# Patient Record
Sex: FEMALE | Race: WHITE | Employment: FULL TIME | URBAN - METROPOLITAN AREA
[De-identification: names, ages, dates, MRNs, and addresses within clinical notes are randomized per-mention and may not be internally consistent; named-entity substitution may affect disease eponyms.]

---

## 2017-01-04 ENCOUNTER — TELEPHONE (OUTPATIENT)
Dept: INTERNAL MEDICINE CLINIC | Facility: CLINIC | Age: 27
End: 2017-01-04

## 2017-01-04 NOTE — TELEPHONE ENCOUNTER
Patient is requesting 2 step TB test for school, as well as TDAP. Patient had last TDAP 8 years ago, but clinic no longer has documentation of vaccine. Patient wants to know if it is ok to get it before 10 years?  If it's ok can you please put the order a

## 2017-01-04 NOTE — TELEPHONE ENCOUNTER
Patient states TDAP was given when they had paper charts and they don't have record of it anymore maybe lost in conversion to EMR. However she does need it for school can she have a new one done here?

## 2017-01-05 ENCOUNTER — NURSE ONLY (OUTPATIENT)
Dept: INTERNAL MEDICINE CLINIC | Facility: CLINIC | Age: 27
End: 2017-01-05

## 2017-01-05 PROCEDURE — 90471 IMMUNIZATION ADMIN: CPT | Performed by: INTERNAL MEDICINE

## 2017-01-05 PROCEDURE — 90715 TDAP VACCINE 7 YRS/> IM: CPT | Performed by: INTERNAL MEDICINE

## 2017-01-05 NOTE — TELEPHONE ENCOUNTER
Can you please put in order for TDAP? Patient asking if TB Quantiferon would be better instead of the two step PPD?

## 2017-01-26 ENCOUNTER — TELEPHONE (OUTPATIENT)
Dept: INTERNAL MEDICINE CLINIC | Facility: CLINIC | Age: 27
End: 2017-01-26

## 2017-01-26 NOTE — TELEPHONE ENCOUNTER
Patient c/o stuffy nose, itchy eyes, and sinus pressure. Patient believes she is having some sort of allergy related illness, and patient is inquiring if she can have some allergy labs drawn? Patient also requesting order for quantiferon. Please advice.

## 2017-02-16 ENCOUNTER — TELEPHONE (OUTPATIENT)
Dept: INTERNAL MEDICINE CLINIC | Facility: CLINIC | Age: 27
End: 2017-02-16

## 2017-02-16 NOTE — TELEPHONE ENCOUNTER
Patient calling having severe migraine 6/10. Nausea with vomiting. No fever, no LOC. Patient wants to know what's best to take for head pain? Please advise.

## 2017-02-20 ENCOUNTER — TELEPHONE (OUTPATIENT)
Dept: INTERNAL MEDICINE CLINIC | Facility: CLINIC | Age: 27
End: 2017-02-20

## 2017-02-20 DIAGNOSIS — Z02.0 SCHOOL PHYSICAL EXAM: Primary | ICD-10-CM

## 2017-02-22 ENCOUNTER — APPOINTMENT (OUTPATIENT)
Dept: LAB | Facility: HOSPITAL | Age: 27
End: 2017-02-22
Attending: INTERNAL MEDICINE
Payer: COMMERCIAL

## 2017-02-22 DIAGNOSIS — Z11.1 SCREENING-PULMONARY TB: ICD-10-CM

## 2017-02-22 DIAGNOSIS — Z02.0 SCHOOL PHYSICAL EXAM: ICD-10-CM

## 2017-02-22 PROCEDURE — 86480 TB TEST CELL IMMUN MEASURE: CPT

## 2017-02-22 PROCEDURE — 36415 COLL VENOUS BLD VENIPUNCTURE: CPT

## 2017-02-22 PROCEDURE — 86706 HEP B SURFACE ANTIBODY: CPT

## 2017-02-23 LAB
HBV SURFACE AB SER-ACNC: 69.13 MIU/ML (ref ?–10)
HBV SURFACE AG SERPL QL IA: REACTIVE

## 2017-02-24 LAB
M TB IFN-G CD4+ BCKGRND COR BLD-ACNC: 0 IU/ML
M TB IFN-G CD4+ T-CELLS BLD-ACNC: 0.08 IU/ML
M TB TUBERC IFN-G BLD QL: NEGATIVE
M TB TUBERC IGNF/MITOGEN IGNF CONTROL: 9.82 IU/ML

## 2017-03-06 ENCOUNTER — OFFICE VISIT (OUTPATIENT)
Dept: INTERNAL MEDICINE CLINIC | Facility: CLINIC | Age: 27
End: 2017-03-06

## 2017-03-06 VITALS
TEMPERATURE: 98 F | DIASTOLIC BLOOD PRESSURE: 80 MMHG | HEART RATE: 90 BPM | WEIGHT: 235 LBS | SYSTOLIC BLOOD PRESSURE: 128 MMHG | BODY MASS INDEX: 35.61 KG/M2 | HEIGHT: 68 IN

## 2017-03-06 DIAGNOSIS — E66.01 MORBID OBESITY, UNSPECIFIED OBESITY TYPE (HCC): ICD-10-CM

## 2017-03-06 DIAGNOSIS — R53.83 OTHER FATIGUE: ICD-10-CM

## 2017-03-06 DIAGNOSIS — D50.9 IRON DEFICIENCY ANEMIA, UNSPECIFIED IRON DEFICIENCY ANEMIA TYPE: Primary | ICD-10-CM

## 2017-03-06 LAB
BASOPHILS # BLD: 0 K/UL (ref 0–0.2)
BASOPHILS NFR BLD: 0 %
CHOLEST SERPL-MCNC: 173 MG/DL (ref 110–200)
EOSINOPHIL # BLD: 0.1 K/UL (ref 0–0.7)
EOSINOPHIL NFR BLD: 1 %
ERYTHROCYTE [DISTWIDTH] IN BLOOD BY AUTOMATED COUNT: 15.8 % (ref 11–15)
FERRITIN SERPL IA-MCNC: 9 NG/ML (ref 11–307)
HCT VFR BLD AUTO: 40.2 % (ref 35–48)
HDLC SERPL-MCNC: 38 MG/DL
HGB BLD-MCNC: 12.9 G/DL (ref 12–16)
IRON SATN MFR SERPL: 7 % (ref 15–50)
IRON SERPL-MCNC: 29 MCG/DL (ref 28–170)
LDLC SERPL CALC-MCNC: 117 MG/DL (ref 0–99)
LYMPHOCYTES # BLD: 1.5 K/UL (ref 1–4)
LYMPHOCYTES NFR BLD: 25 %
MCH RBC QN AUTO: 25.5 PG (ref 27–32)
MCHC RBC AUTO-ENTMCNC: 32.1 G/DL (ref 32–37)
MCV RBC AUTO: 79.6 FL (ref 80–100)
MONOCYTES # BLD: 0.3 K/UL (ref 0–1)
MONOCYTES NFR BLD: 6 %
NEUTROPHILS # BLD AUTO: 3.8 K/UL (ref 1.8–7.7)
NEUTROPHILS NFR BLD: 67 %
NONHDLC SERPL-MCNC: 135 MG/DL
PLATELET # BLD AUTO: 348 K/UL (ref 140–400)
PMV BLD AUTO: 8.6 FL (ref 7.4–10.3)
RBC # BLD AUTO: 5.06 M/UL (ref 3.7–5.4)
TIBC SERPL-MCNC: 413 MCG/DL (ref 228–428)
TRANSFERRIN SERPL-MCNC: 313 MG/DL (ref 192–382)
TRANSFERRIN SERPL-MCNC: 313 MG/DL (ref 192–382)
TRIGL SERPL-MCNC: 92 MG/DL (ref 1–149)
WBC # BLD AUTO: 5.7 K/UL (ref 4–11)

## 2017-03-06 PROCEDURE — 36415 COLL VENOUS BLD VENIPUNCTURE: CPT | Performed by: INTERNAL MEDICINE

## 2017-03-06 PROCEDURE — 99213 OFFICE O/P EST LOW 20 MIN: CPT | Performed by: INTERNAL MEDICINE

## 2017-03-06 PROCEDURE — 99212 OFFICE O/P EST SF 10 MIN: CPT | Performed by: INTERNAL MEDICINE

## 2017-03-06 RX ORDER — PHENTERMINE HYDROCHLORIDE 37.5 MG/1
37.5 CAPSULE ORAL EVERY MORNING
Qty: 30 CAPSULE | Refills: 2 | Status: SHIPPED | OUTPATIENT
Start: 2017-03-06 | End: 2018-05-22

## 2017-03-06 NOTE — PROGRESS NOTES
HPI:    Patient ID: Tani Weaver is a 27year old female. HPI  Pt here for follow up on her anemia, she has been taking the iron tabs and eating more iron rich food. Pt over all feeling better bit still fatigued, she has not been told to snore.  Pt wa intact distal pulses. Pulmonary/Chest: Effort normal and breath sounds normal.   Abdominal: Soft. Bowel sounds are normal.   Musculoskeletal: Normal range of motion. Neurological: She is alert and oriented to person, place, and time.  She has normal re

## 2017-03-06 NOTE — PATIENT INSTRUCTIONS
ASSESSMENT/PLAN:   Iron deficiency anemia, unspecified iron deficiency anemia type  (primary encounter diagnosis)- will retest, will let you know of results   Other fatigue- ?  Need to loose weight , diet and exercise, labs so fare ok pt says she has not be

## 2017-03-22 ENCOUNTER — NURSE ONLY (OUTPATIENT)
Dept: INTERNAL MEDICINE CLINIC | Facility: CLINIC | Age: 27
End: 2017-03-22

## 2017-03-22 ENCOUNTER — TELEPHONE (OUTPATIENT)
Dept: INTERNAL MEDICINE CLINIC | Facility: CLINIC | Age: 27
End: 2017-03-22

## 2017-03-22 DIAGNOSIS — Z02.0 SCHOOL PHYSICAL EXAM: ICD-10-CM

## 2017-03-22 DIAGNOSIS — Z02.0 SCHOOL PHYSICAL EXAM: Primary | ICD-10-CM

## 2017-03-22 PROCEDURE — 36415 COLL VENOUS BLD VENIPUNCTURE: CPT | Performed by: INTERNAL MEDICINE

## 2017-03-23 LAB — HBV SURFACE AG SERPL QL IA: NONREACTIVE

## 2017-04-30 ENCOUNTER — OFFICE VISIT (OUTPATIENT)
Dept: FAMILY MEDICINE CLINIC | Facility: CLINIC | Age: 27
End: 2017-04-30

## 2017-04-30 VITALS
SYSTOLIC BLOOD PRESSURE: 128 MMHG | OXYGEN SATURATION: 98 % | HEART RATE: 90 BPM | WEIGHT: 286 LBS | DIASTOLIC BLOOD PRESSURE: 80 MMHG | RESPIRATION RATE: 18 BRPM | BODY MASS INDEX: 44 KG/M2 | TEMPERATURE: 98 F

## 2017-04-30 DIAGNOSIS — J02.9 PHARYNGITIS, UNSPECIFIED ETIOLOGY: Primary | ICD-10-CM

## 2017-04-30 PROCEDURE — 87880 STREP A ASSAY W/OPTIC: CPT | Performed by: NURSE PRACTITIONER

## 2017-04-30 PROCEDURE — 99203 OFFICE O/P NEW LOW 30 MIN: CPT | Performed by: NURSE PRACTITIONER

## 2017-04-30 NOTE — PROGRESS NOTES
Ophelia Chandler CHIEF COMPLAINT:   Patient presents with:  Sore Throat: since thursday, unsure of contact. HPI:   Eriberto Briceño is a 32year old female presents to clinic with complaint of sore throat. Patient has had for 4  days.  Patient reports following a THROAT: oral mucosa pink, moist. Posterior pharynx erythematous and injected. No exudates. Tonsils 2/4. Breath is not malodorous. No trismus, hoarseness, muffled voice, stridor, or uvular deviation.     NECK: supple, non-tender  LUNGS: clear to auscultati Sore throats happen for many reasons, such as colds, allergies, and infections caused by viruses or bacteria. In any case, your throat becomes red and sore.  Your goal for self-care is to reduce your discomfort while giving your throat a chance to heal. · A temperature over 101°F (38.3°C)  · White spots on the throat  · Great difficulty swallowing  · Trouble breathing  · A skin rash  · Recent exposure to someone else with strep bacteria  · Severe hoarseness and swollen glands in the neck or jaw   Date Las

## 2017-06-28 ENCOUNTER — TELEPHONE (OUTPATIENT)
Dept: INTERNAL MEDICINE CLINIC | Facility: CLINIC | Age: 27
End: 2017-06-28

## 2017-06-28 RX ORDER — ALPRAZOLAM 0.5 MG/1
0.5 TABLET ORAL 2 TIMES DAILY PRN
Qty: 10 TABLET | Refills: 0 | Status: SHIPPED | OUTPATIENT
Start: 2017-06-28 | End: 2017-07-08

## 2017-06-28 NOTE — TELEPHONE ENCOUNTER
Patient will be going out of state this weekend and has anxiety when flying. Requesting prescription.

## 2017-08-03 ENCOUNTER — NURSE ONLY (OUTPATIENT)
Dept: INTERNAL MEDICINE CLINIC | Facility: CLINIC | Age: 27
End: 2017-08-03

## 2017-08-03 ENCOUNTER — TELEPHONE (OUTPATIENT)
Dept: INTERNAL MEDICINE CLINIC | Facility: CLINIC | Age: 27
End: 2017-08-03

## 2017-08-03 DIAGNOSIS — Z20.2 EXPOSURE TO STD: Primary | ICD-10-CM

## 2017-08-03 DIAGNOSIS — Z20.2 EXPOSURE TO STD: ICD-10-CM

## 2017-08-03 NOTE — TELEPHONE ENCOUNTER
Patient has a concern. Due to recent needle stick at her employer, was told that she has \"moderate leukocyte esterase,\" in her urine and was told that she might have a bladder infection.   Patient is asymptomatic, but wants to know if she can have a urin

## 2017-08-03 NOTE — TELEPHONE ENCOUNTER
We can check us but that has nothing to do with needle stick  She needs t be checkd only of rhiv, hep b and hep c

## 2017-08-05 LAB
C TRACH DNA SPEC QL NAA+PROBE: NEGATIVE
N GONORRHOEA DNA SPEC QL NAA+PROBE: NEGATIVE

## 2017-09-16 ENCOUNTER — HOSPITAL ENCOUNTER (OUTPATIENT)
Age: 27
Discharge: HOME | End: 2017-09-16
Attending: FAMILY MEDICINE
Payer: COMMERCIAL

## 2017-09-16 VITALS
RESPIRATION RATE: 16 BRPM | DIASTOLIC BLOOD PRESSURE: 78 MMHG | SYSTOLIC BLOOD PRESSURE: 121 MMHG | HEART RATE: 92 BPM | OXYGEN SATURATION: 99 % | TEMPERATURE: 98 F

## 2017-09-16 DIAGNOSIS — N30.01 ACUTE CYSTITIS WITH HEMATURIA: Primary | ICD-10-CM

## 2017-09-16 LAB
B-HCG UR QL: NEGATIVE
URINE COLOR: YELLOW
URINE GLUCOSE: NEGATIVE MG/DL
URINE KETONES: NEGATIVE MG/DL
URINE NITRITE: NEGATIVE
URINE PH: 5
URINE SPECIFIC GRAVITY: >=1.03
URINE UROBILINOGEN: 0.2 MG/DL

## 2017-09-16 PROCEDURE — 87086 URINE CULTURE/COLONY COUNT: CPT | Performed by: FAMILY MEDICINE

## 2017-09-16 PROCEDURE — 81025 URINE PREGNANCY TEST: CPT

## 2017-09-16 PROCEDURE — 99214 OFFICE O/P EST MOD 30 MIN: CPT

## 2017-09-16 PROCEDURE — 81002 URINALYSIS NONAUTO W/O SCOPE: CPT

## 2017-09-16 RX ORDER — SULFAMETHOXAZOLE AND TRIMETHOPRIM 800; 160 MG/1; MG/1
1 TABLET ORAL 2 TIMES DAILY
Qty: 14 TABLET | Refills: 0 | Status: SHIPPED | OUTPATIENT
Start: 2017-09-16 | End: 2017-09-23

## 2017-09-16 NOTE — ED INITIAL ASSESSMENT (HPI)
Patient comes in with urinary symptoms for the past two days, urgency, frequency. Positive blood in the urine, positive right-sided back pain.

## 2017-09-16 NOTE — ED PROVIDER NOTES
Patient Seen in: [address]    History   Patient presents with:  Urinary Symptoms (urologic)    Stated Complaint: blood in urine    HPI    32year old Female patient presents with dysuria, urgency and frequency for several Pulse 92   Temp 98.3 °F (36.8 °C) (Oral)   Resp 16   LMP 08/29/2017 (Exact Date)   SpO2 99%     GENERAL: hydrated, comfortable, NAD, overweight habitus  HEENT:  PERRLA, EOMI, MMM  NECK: supple, no adenopathy  LUNGS:  b/l cta with good air entry  CARDIO: R

## 2017-09-19 ENCOUNTER — NURSE ONLY (OUTPATIENT)
Dept: INTERNAL MEDICINE CLINIC | Facility: CLINIC | Age: 27
End: 2017-09-19

## 2017-09-19 DIAGNOSIS — R31.9 HEMATURIA, UNSPECIFIED TYPE: Primary | ICD-10-CM

## 2017-09-19 DIAGNOSIS — N20.0 KIDNEY STONE: ICD-10-CM

## 2017-09-19 LAB
MULTISTIX LOT#: NORMAL NUMERIC
PH, URINE: 6.5 (ref 4.5–8)
SPECIFIC GRAVITY: 1.02 (ref 1–1.03)
URINE-COLOR: YELLOW
UROBILINOGEN,SEMI-QN: 0.2 MG/DL (ref 0–1.9)

## 2017-09-19 PROCEDURE — 81003 URINALYSIS AUTO W/O SCOPE: CPT | Performed by: INTERNAL MEDICINE

## 2017-09-30 ENCOUNTER — HOSPITAL ENCOUNTER (OUTPATIENT)
Dept: GENERAL RADIOLOGY | Facility: HOSPITAL | Age: 27
Discharge: HOME | End: 2017-09-30
Attending: INTERNAL MEDICINE
Payer: COMMERCIAL

## 2017-09-30 DIAGNOSIS — N20.0 KIDNEY STONE: ICD-10-CM

## 2017-09-30 DIAGNOSIS — R31.9 HEMATURIA, UNSPECIFIED TYPE: ICD-10-CM

## 2017-09-30 PROCEDURE — 74000 XR ABDOMEN (1 VIEW) (CPT=74000): CPT | Performed by: INTERNAL MEDICINE

## 2018-01-31 ENCOUNTER — OFFICE VISIT (OUTPATIENT)
Dept: OBGYN CLINIC | Facility: CLINIC | Age: 28
End: 2018-01-31

## 2018-01-31 VITALS
BODY MASS INDEX: 42 KG/M2 | SYSTOLIC BLOOD PRESSURE: 122 MMHG | HEART RATE: 84 BPM | DIASTOLIC BLOOD PRESSURE: 81 MMHG | WEIGHT: 279 LBS

## 2018-01-31 DIAGNOSIS — Z11.3 VENEREAL DISEASE SCREENING: ICD-10-CM

## 2018-01-31 DIAGNOSIS — Z12.4 SCREENING FOR MALIGNANT NEOPLASM OF CERVIX: ICD-10-CM

## 2018-01-31 DIAGNOSIS — Z01.419 ENCOUNTER FOR GYNECOLOGICAL EXAMINATION WITHOUT ABNORMAL FINDING: Primary | ICD-10-CM

## 2018-01-31 PROCEDURE — 99385 PREV VISIT NEW AGE 18-39: CPT | Performed by: OBSTETRICS & GYNECOLOGY

## 2018-01-31 NOTE — PROGRESS NOTES
HPI:    Patient ID: Carina Lake is a 32year old female. HPI   with menses q 1-2 months / 3d / light on [place] placed in 2016 for [facility]. She is in an 18 month on / off relationship. She is finishing bachelors in Nursing at [employer].  She heart sounds. No murmur heard. Pulmonary/Chest: Effort normal and breath sounds normal. She has no wheezes. She has no rales. Bilateral breasts without skin / nipple changes, mass, tenderness or axillary adenopathy. Abdominal: Soft.  She exhibits

## 2018-02-02 LAB
C TRACH DNA SPEC QL NAA+PROBE: NEGATIVE
N GONORRHOEA DNA SPEC QL NAA+PROBE: NEGATIVE
T VAGINALIS RRNA SPEC QL NAA+PROBE: NEGATIVE

## 2018-05-22 ENCOUNTER — OFFICE VISIT (OUTPATIENT)
Dept: INTERNAL MEDICINE CLINIC | Facility: CLINIC | Age: 28
End: 2018-05-22

## 2018-05-22 VITALS
SYSTOLIC BLOOD PRESSURE: 130 MMHG | DIASTOLIC BLOOD PRESSURE: 89 MMHG | BODY MASS INDEX: 41 KG/M2 | HEART RATE: 97 BPM | TEMPERATURE: 98 F | WEIGHT: 271 LBS

## 2018-05-22 DIAGNOSIS — Z86.2 HISTORY OF IRON DEFICIENCY ANEMIA: ICD-10-CM

## 2018-05-22 DIAGNOSIS — R55 PRE-SYNCOPE: ICD-10-CM

## 2018-05-22 DIAGNOSIS — R03.0 BLOOD PRESSURE ELEVATED WITHOUT HISTORY OF HTN: ICD-10-CM

## 2018-05-22 DIAGNOSIS — R00.2 HEART PALPITATIONS: Primary | ICD-10-CM

## 2018-05-22 PROCEDURE — 93000 ELECTROCARDIOGRAM COMPLETE: CPT | Performed by: INTERNAL MEDICINE

## 2018-05-22 PROCEDURE — 99214 OFFICE O/P EST MOD 30 MIN: CPT | Performed by: INTERNAL MEDICINE

## 2018-05-22 NOTE — PROGRESS NOTES
HPI:    Patient ID: Xiomy Yi is a 25year old female.     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 She appears well-developed and well-nourished.    IZABELA

## 2018-05-22 NOTE — PATIENT INSTRUCTIONS
ASSESSMENT/PLAN:   Heart palpitations  (primary encounter diagnosis) eKG looks okay we will order Holter monitor and echo symptoms have been again let us know go to ER  Pre-syncope as above  Blood pressure elevated without history of htn slightly on the hi

## 2018-06-24 ENCOUNTER — HOSPITAL ENCOUNTER (EMERGENCY)
Facility: HOSPITAL | Age: 28
Discharge: HOME | End: 2018-06-25
Attending: EMERGENCY MEDICINE
Payer: COMMERCIAL

## 2018-06-24 ENCOUNTER — APPOINTMENT (OUTPATIENT)
Dept: GENERAL RADIOLOGY | Facility: HOSPITAL | Age: 28
End: 2018-06-24
Attending: EMERGENCY MEDICINE
Payer: COMMERCIAL

## 2018-06-24 DIAGNOSIS — S92.301A CLOSED NONDISPLACED FRACTURE OF METATARSAL BONE OF RIGHT FOOT, UNSPECIFIED METATARSAL, INITIAL ENCOUNTER: Primary | ICD-10-CM

## 2018-06-24 PROCEDURE — 99284 EMERGENCY DEPT VISIT MOD MDM: CPT

## 2018-06-24 PROCEDURE — 28470 CLTX METATARSAL FX WO MNP EA: CPT

## 2018-06-24 PROCEDURE — 73630 X-RAY EXAM OF FOOT: CPT | Performed by: EMERGENCY MEDICINE

## 2018-06-24 PROCEDURE — 73610 X-RAY EXAM OF ANKLE: CPT | Performed by: EMERGENCY MEDICINE

## 2018-06-24 RX ORDER — IBUPROFEN 600 MG/1
600 TABLET ORAL ONCE
Status: COMPLETED | OUTPATIENT
Start: 2018-06-24 | End: 2018-06-24

## 2018-06-25 ENCOUNTER — TELEPHONE (OUTPATIENT)
Dept: INTERNAL MEDICINE CLINIC | Facility: CLINIC | Age: 28
End: 2018-06-25

## 2018-06-25 VITALS
HEART RATE: 81 BPM | DIASTOLIC BLOOD PRESSURE: 98 MMHG | SYSTOLIC BLOOD PRESSURE: 143 MMHG | RESPIRATION RATE: 17 BRPM | TEMPERATURE: 97 F | OXYGEN SATURATION: 98 % | BODY MASS INDEX: 37.89 KG/M2 | HEIGHT: 68 IN | WEIGHT: 250 LBS

## 2018-06-25 DIAGNOSIS — S92.901A CLOSED FRACTURE OF RIGHT FOOT, INITIAL ENCOUNTER: Primary | ICD-10-CM

## 2018-06-25 NOTE — ED PROVIDER NOTES
Patient Seen in: [facility] AND [facility] Emergency Department    History   Patient presents with:  Lower Extremity Injury (musculoskeletal)      HPI    Patient presents to the ED complaining of right ankle pain after tripping and falling while going down a yuly tracheal deviation present. Cardiovascular: Intact distal pulses. Pulmonary/Chest: Effort normal. No stridor. No respiratory distress. Abdominal: Soft. Musculoskeletal: She exhibits tenderness. She exhibits no edema or deformity.    Tenderness to t Factors: The patient already has has Osteochondral lesion; Chronic pain of right knee;  History of iron deficiency anemia; Blood pressure elevated without history of HTN; Heart palpitations; and Pre-syncope on her problem list. to contribute to the complexi

## 2018-06-26 ENCOUNTER — TELEPHONE (OUTPATIENT)
Dept: INTERNAL MEDICINE CLINIC | Facility: CLINIC | Age: 28
End: 2018-06-26

## 2018-06-26 DIAGNOSIS — S92.901D CLOSED FRACTURE OF RIGHT FOOT WITH ROUTINE HEALING, SUBSEQUENT ENCOUNTER: Primary | ICD-10-CM

## 2018-06-26 NOTE — TELEPHONE ENCOUNTER
Pt saw Dr Janet Lizama, was told to call us and request a referral for a Walking boot for her  fracture of right foot,

## 2018-06-27 ENCOUNTER — TELEPHONE (OUTPATIENT)
Dept: OBGYN CLINIC | Facility: CLINIC | Age: 28
End: 2018-06-27

## 2018-06-27 NOTE — TELEPHONE ENCOUNTER
Pt has an IUD and wants it removed by THERESA.  (Notes state it was inserted April 2016. It was inserted by another MD, not our group.)  Pt states that she has btb. She will bleed for one day and then stop and start up again.   Pt states previously she bled f

## 2018-07-02 NOTE — TELEPHONE ENCOUNTER
Order for leg splint faxed to [facility] in [place] Fax # [phone]

## 2018-07-03 ENCOUNTER — TELEPHONE (OUTPATIENT)
Dept: INTERNAL MEDICINE CLINIC | Facility: CLINIC | Age: 28
End: 2018-07-03

## 2018-07-03 DIAGNOSIS — S92.902A CLOSED FRACTURE OF LEFT FOOT, INITIAL ENCOUNTER: Primary | ICD-10-CM

## 2018-07-11 ENCOUNTER — OFFICE VISIT (OUTPATIENT)
Dept: OBGYN CLINIC | Facility: CLINIC | Age: 28
End: 2018-07-11

## 2018-07-11 VITALS — SYSTOLIC BLOOD PRESSURE: 120 MMHG | DIASTOLIC BLOOD PRESSURE: 80 MMHG | HEART RATE: 92 BPM

## 2018-07-11 DIAGNOSIS — Z30.432 ENCOUNTER FOR REMOVAL OF INTRAUTERINE CONTRACEPTIVE DEVICE: Primary | ICD-10-CM

## 2018-07-11 PROCEDURE — 58301 REMOVE INTRAUTERINE DEVICE: CPT | Performed by: OBSTETRICS & GYNECOLOGY

## 2018-07-21 NOTE — PROGRESS NOTES
IUD Removal     Pregnancy Results: n/a  Birth control method(s) used: Kamila / abstinence  Consent signed. Procedure discussed with the patient in detail including indication, risks, benefits, alternatives and complications.     Pelvic Exam Findings:  EG, v

## 2018-10-22 ENCOUNTER — PATIENT MESSAGE (OUTPATIENT)
Dept: OBGYN CLINIC | Facility: CLINIC | Age: 28
End: 2018-10-22

## 2018-10-23 NOTE — TELEPHONE ENCOUNTER
C/O INTERMITTENT LLQ PAIN. PERIOD IS 4 DAYS LATE NOW. UPT WAS NEGATIVE. DOES NOT FEEL LIKE SHE IS PREGNANT EITHER. STATES MOM WITH H/O MULTIPLE OVARIAN CYSTS AND WONDERS IF SHE HAS ONE.   BOOKED APPT FOR TOMORROW WITH THERESA.

## 2018-10-24 ENCOUNTER — OFFICE VISIT (OUTPATIENT)
Dept: OBGYN CLINIC | Facility: CLINIC | Age: 28
End: 2018-10-24

## 2018-10-24 VITALS
BODY MASS INDEX: 38 KG/M2 | DIASTOLIC BLOOD PRESSURE: 85 MMHG | WEIGHT: 252 LBS | SYSTOLIC BLOOD PRESSURE: 128 MMHG | HEART RATE: 87 BPM

## 2018-10-24 DIAGNOSIS — R10.2 ACUTE PELVIC PAIN, FEMALE: Primary | ICD-10-CM

## 2018-10-24 PROCEDURE — 99213 OFFICE O/P EST LOW 20 MIN: CPT | Performed by: OBSTETRICS & GYNECOLOGY

## 2018-11-05 NOTE — PROGRESS NOTES
HPI:    Patient ID: Buddy Quiñones is a 25year old female. HPI  with monthly menses and condoms for [facility]. Kamila removed 18 for persistent metrorrhagia. SInce then menses q 30d / 7d / 2 heaviwr days.  She is here c/o 3 days of sharp LLQ pain Referrals:  None       DB#[id number]

## 2018-12-14 ENCOUNTER — HOSPITAL ENCOUNTER (OUTPATIENT)
Age: 28
Discharge: HOME | End: 2018-12-14
Attending: FAMILY MEDICINE
Payer: COMMERCIAL

## 2018-12-14 VITALS
OXYGEN SATURATION: 98 % | HEART RATE: 84 BPM | DIASTOLIC BLOOD PRESSURE: 82 MMHG | BODY MASS INDEX: 37.89 KG/M2 | RESPIRATION RATE: 20 BRPM | SYSTOLIC BLOOD PRESSURE: 123 MMHG | TEMPERATURE: 98 F | HEIGHT: 68 IN | WEIGHT: 250 LBS

## 2018-12-14 DIAGNOSIS — J01.00 ACUTE MAXILLARY SINUSITIS, RECURRENCE NOT SPECIFIED: Primary | ICD-10-CM

## 2018-12-14 PROCEDURE — 99213 OFFICE O/P EST LOW 20 MIN: CPT

## 2018-12-14 PROCEDURE — 99214 OFFICE O/P EST MOD 30 MIN: CPT

## 2018-12-14 RX ORDER — METHYLPREDNISOLONE 4 MG/1
TABLET ORAL
Qty: 1 PACKAGE | Refills: 0 | Status: SHIPPED | OUTPATIENT
Start: 2018-12-14 | End: 2018-12-19

## 2018-12-14 RX ORDER — AMOXICILLIN 875 MG/1
875 TABLET, COATED ORAL 2 TIMES DAILY
Qty: 20 TABLET | Refills: 0 | Status: SHIPPED | OUTPATIENT
Start: 2018-12-14 | End: 2018-12-24

## 2018-12-14 RX ORDER — FLUTICASONE PROPIONATE 50 MCG
1 SPRAY, SUSPENSION (ML) NASAL 2 TIMES DAILY
Qty: 1 BOTTLE | Refills: 0 | Status: SHIPPED | OUTPATIENT
Start: 2018-12-14 | End: 2018-12-28

## 2018-12-14 NOTE — ED INITIAL ASSESSMENT (HPI)
Pt presents to clinic by self c/o cough and sore throat for 1 month. She reports cough has worsened within the last few days. She states right-sided ear pain. Denied fever, body aches/chills, n/v/d. She reports taking dayquil, nyquil, ibuprofen, theraflu.

## 2018-12-14 NOTE — ED NOTES
Pt discharged home in stable condition. Reviewed medications, pain, letter and avs. Follow up as indicated. Pt verbalized understanding and agreed.

## 2018-12-15 NOTE — ED PROVIDER NOTES
Patient presents with:  Cough/URI      HPI:     Natividad Luevano is a 25year old female who presents with for chief complaint of nasal congestion, sinus congestion, brown to yellow nasal discharge, mild sore throat, mild cough  X 1 month  The patient denie rub or gallop, regular rate and rhythm  Skin: Skin color, texture, turgor normal. No rashes or lesions      Assessment/Plan:       Diagnosis:    ICD-10-CM    1.  Acute maxillary sinusitis, recurrence not specified J01.00        Steam inhalation, humidified

## 2020-02-13 ENCOUNTER — APPOINTMENT (OUTPATIENT)
Dept: GENERAL RADIOLOGY | Age: 30
End: 2020-02-13
Attending: EMERGENCY MEDICINE
Payer: COMMERCIAL

## 2020-02-13 ENCOUNTER — TELEPHONE (OUTPATIENT)
Dept: INTERNAL MEDICINE CLINIC | Facility: CLINIC | Age: 30
End: 2020-02-13

## 2020-02-13 ENCOUNTER — HOSPITAL ENCOUNTER (OUTPATIENT)
Age: 30
Discharge: HOME | End: 2020-02-13
Attending: EMERGENCY MEDICINE
Payer: COMMERCIAL

## 2020-02-13 VITALS
DIASTOLIC BLOOD PRESSURE: 86 MMHG | SYSTOLIC BLOOD PRESSURE: 131 MMHG | HEART RATE: 95 BPM | RESPIRATION RATE: 20 BRPM | OXYGEN SATURATION: 100 % | TEMPERATURE: 98 F

## 2020-02-13 DIAGNOSIS — S92.514A CLOSED NONDISPLACED FRACTURE OF PROXIMAL PHALANX OF LESSER TOE OF RIGHT FOOT, INITIAL ENCOUNTER: Primary | ICD-10-CM

## 2020-02-13 PROCEDURE — 99213 OFFICE O/P EST LOW 20 MIN: CPT

## 2020-02-13 PROCEDURE — 28510 TREATMENT OF TOE FRACTURE: CPT

## 2020-02-13 PROCEDURE — 73630 X-RAY EXAM OF FOOT: CPT | Performed by: EMERGENCY MEDICINE

## 2020-02-13 RX ORDER — IBUPROFEN 600 MG/1
600 TABLET ORAL ONCE
Status: COMPLETED | OUTPATIENT
Start: 2020-02-13 | End: 2020-02-13

## 2020-02-13 NOTE — TELEPHONE ENCOUNTER
Left message to call back, patient seen in UC today with fracture to toe of right foot. Left msg for follow up appt, recommended in 1 week.     Notes recorded by Zhou Liu MD on 2/13/2020 at 11:44 AM CST  She was seen in urgent care for this fracture

## 2020-02-17 ENCOUNTER — OFFICE VISIT (OUTPATIENT)
Dept: INTERNAL MEDICINE CLINIC | Facility: CLINIC | Age: 30
End: 2020-02-17

## 2020-02-17 VITALS
DIASTOLIC BLOOD PRESSURE: 73 MMHG | HEIGHT: 68 IN | TEMPERATURE: 98 F | HEART RATE: 89 BPM | SYSTOLIC BLOOD PRESSURE: 103 MMHG | RESPIRATION RATE: 18 BRPM | BODY MASS INDEX: 40.92 KG/M2 | WEIGHT: 270 LBS

## 2020-02-17 DIAGNOSIS — Z00.00 ANNUAL PHYSICAL EXAM: Primary | ICD-10-CM

## 2020-02-17 PROBLEM — R03.0 BLOOD PRESSURE ELEVATED WITHOUT HISTORY OF HTN: Status: RESOLVED | Noted: 2018-05-22 | Resolved: 2020-02-17

## 2020-02-17 PROBLEM — R55 PRE-SYNCOPE: Status: RESOLVED | Noted: 2018-05-22 | Resolved: 2020-02-17

## 2020-02-17 PROBLEM — R00.2 HEART PALPITATIONS: Status: RESOLVED | Noted: 2018-05-22 | Resolved: 2020-02-17

## 2020-02-17 LAB
ALBUMIN SERPL-MCNC: 3.6 G/DL (ref 3.4–5)
ALBUMIN/GLOB SERPL: 0.9 {RATIO} (ref 1–2)
ALP LIVER SERPL-CCNC: 94 U/L (ref 37–98)
ALT SERPL-CCNC: 25 U/L (ref 13–56)
ANION GAP SERPL CALC-SCNC: 10 MMOL/L (ref 0–18)
AST SERPL-CCNC: 21 U/L (ref 15–37)
BASOPHILS # BLD AUTO: 0.02 X10(3) UL (ref 0–0.2)
BASOPHILS NFR BLD AUTO: 0.5 %
BILIRUB SERPL-MCNC: 0.3 MG/DL (ref 0.1–2)
BUN BLD-MCNC: 6 MG/DL (ref 7–18)
BUN/CREAT SERPL: 6.7 (ref 10–20)
CALCIUM BLD-MCNC: 9.2 MG/DL (ref 8.5–10.1)
CHLORIDE SERPL-SCNC: 107 MMOL/L (ref 98–112)
CO2 SERPL-SCNC: 23 MMOL/L (ref 21–32)
CREAT BLD-MCNC: 0.9 MG/DL (ref 0.55–1.02)
DEPRECATED HBV CORE AB SER IA-ACNC: 16.5 NG/ML (ref 12–160)
DEPRECATED RDW RBC AUTO: 44.8 FL (ref 35.1–46.3)
EOSINOPHIL # BLD AUTO: 0.06 X10(3) UL (ref 0–0.7)
EOSINOPHIL NFR BLD AUTO: 1.4 %
ERYTHROCYTE [DISTWIDTH] IN BLOOD BY AUTOMATED COUNT: 14.1 % (ref 11–15)
GLOBULIN PLAS-MCNC: 4 G/DL (ref 2.8–4.4)
GLUCOSE BLD-MCNC: 69 MG/DL (ref 70–99)
HCT VFR BLD AUTO: 44.4 % (ref 35–48)
HGB BLD-MCNC: 14.4 G/DL (ref 12–16)
IMM GRANULOCYTES # BLD AUTO: 0 X10(3) UL (ref 0–1)
IMM GRANULOCYTES NFR BLD: 0 %
LYMPHOCYTES # BLD AUTO: 1.18 X10(3) UL (ref 1–4)
LYMPHOCYTES NFR BLD AUTO: 27.2 %
M PROTEIN MFR SERPL ELPH: 7.6 G/DL (ref 6.4–8.2)
MCH RBC QN AUTO: 28.1 PG (ref 26–34)
MCHC RBC AUTO-ENTMCNC: 32.4 G/DL (ref 31–37)
MCV RBC AUTO: 86.7 FL (ref 80–100)
MONOCYTES # BLD AUTO: 0.27 X10(3) UL (ref 0.1–1)
MONOCYTES NFR BLD AUTO: 6.2 %
NEUTROPHILS # BLD AUTO: 2.81 X10 (3) UL (ref 1.5–7.7)
NEUTROPHILS # BLD AUTO: 2.81 X10(3) UL (ref 1.5–7.7)
NEUTROPHILS NFR BLD AUTO: 64.7 %
OSMOLALITY SERPL CALC.SUM OF ELEC: 286 MOSM/KG (ref 275–295)
PATIENT FASTING Y/N/NP: YES
PLATELET # BLD AUTO: 304 10(3)UL (ref 150–450)
POTASSIUM SERPL-SCNC: 3.9 MMOL/L (ref 3.5–5.1)
RBC # BLD AUTO: 5.12 X10(6)UL (ref 3.8–5.3)
SODIUM SERPL-SCNC: 140 MMOL/L (ref 136–145)
TSI SER-ACNC: 1.44 MIU/ML (ref 0.36–3.74)
VIT B12 SERPL-MCNC: >2000 PG/ML (ref 193–986)
WBC # BLD AUTO: 4.3 X10(3) UL (ref 4–11)

## 2020-02-17 PROCEDURE — 99395 PREV VISIT EST AGE 18-39: CPT | Performed by: INTERNAL MEDICINE

## 2020-02-17 NOTE — PROGRESS NOTES
HPI:   Otto Olivares is a 30year old female who presents for a complete physical exam.  She is also following for uc .  She fell from the bed , she went ot uc , xr showed nondisplaced   Fifth toe fracture     Wt Readings from Last 3 Encounters:  02/17/20 numbness in extremities, seizures and tremors. Psych Negative Anxiety, depression and insomnia. Integumentary Negative Breast discharge, breast lump(s), hair loss and rash. MS Negative Back pain and joint pain.    Hema/Lymph Negative Easy bleeding, ea toe nondisplaced fracture advised to buddy tape the toe continue with boot , pain medication as needed   Iron deficiency anemia we will check her hemoglobin  Pt' s weight is Body mass index is 41.05 kg/m². , recommended low fat diet and aerobic exercise 30

## 2020-02-19 ENCOUNTER — OFFICE VISIT (OUTPATIENT)
Dept: OBGYN CLINIC | Facility: CLINIC | Age: 30
End: 2020-02-19

## 2020-02-19 VITALS — DIASTOLIC BLOOD PRESSURE: 78 MMHG | SYSTOLIC BLOOD PRESSURE: 117 MMHG | HEART RATE: 83 BPM

## 2020-02-19 DIAGNOSIS — N92.0 MENORRHAGIA WITH REGULAR CYCLE: ICD-10-CM

## 2020-02-19 DIAGNOSIS — R63.5 WEIGHT GAIN, ABNORMAL: ICD-10-CM

## 2020-02-19 DIAGNOSIS — Z11.3 SCREENING EXAMINATION FOR VENEREAL DISEASE: ICD-10-CM

## 2020-02-19 DIAGNOSIS — Z01.411 ENCOUNTER FOR GYNECOLOGICAL EXAMINATION WITH ABNORMAL FINDING: Primary | ICD-10-CM

## 2020-02-19 DIAGNOSIS — L68.0 HIRSUTISM: ICD-10-CM

## 2020-02-19 PROCEDURE — 99395 PREV VISIT EST AGE 18-39: CPT | Performed by: OBSTETRICS & GYNECOLOGY

## 2020-02-20 ENCOUNTER — APPOINTMENT (OUTPATIENT)
Dept: LAB | Facility: HOSPITAL | Age: 30
End: 2020-02-20
Attending: OBSTETRICS & GYNECOLOGY
Payer: COMMERCIAL

## 2020-02-20 DIAGNOSIS — L68.0 HIRSUTISM: ICD-10-CM

## 2020-02-20 DIAGNOSIS — R63.5 WEIGHT GAIN, ABNORMAL: ICD-10-CM

## 2020-02-20 LAB
C TRACH DNA SPEC QL NAA+PROBE: NEGATIVE
GLUCOSE BLD-MCNC: 78 MG/DL (ref 70–99)
INSULIN SERPL-ACNC: 6.7 MU/L (ref 3–25)
N GONORRHOEA DNA SPEC QL NAA+PROBE: NEGATIVE

## 2020-02-20 PROCEDURE — 83498 ASY HYDROXYPROGESTERONE 17-D: CPT

## 2020-02-20 PROCEDURE — 84403 ASSAY OF TOTAL TESTOSTERONE: CPT

## 2020-02-20 PROCEDURE — 83525 ASSAY OF INSULIN: CPT

## 2020-02-20 PROCEDURE — 84402 ASSAY OF FREE TESTOSTERONE: CPT

## 2020-02-20 PROCEDURE — 82947 ASSAY GLUCOSE BLOOD QUANT: CPT

## 2020-02-20 PROCEDURE — 36415 COLL VENOUS BLD VENIPUNCTURE: CPT

## 2020-02-21 LAB — T VAGINALIS RRNA SPEC QL NAA+PROBE: NEGATIVE

## 2020-02-22 LAB
TESTOSTERONE, FREE, S: 0.74 NG/DL
TESTOSTERONE, TOTAL, S: 67 NG/DL

## 2020-02-23 LAB — 17-HYDROPROGESTERONE QNT: 27.57 NG/DL

## 2020-02-26 ENCOUNTER — TELEPHONE (OUTPATIENT)
Dept: INTERNAL MEDICINE CLINIC | Facility: CLINIC | Age: 30
End: 2020-02-26

## 2020-02-26 PROBLEM — L68.0 HIRSUTISM: Status: ACTIVE | Noted: 2020-02-26

## 2020-02-26 PROBLEM — R63.5 WEIGHT GAIN, ABNORMAL: Status: ACTIVE | Noted: 2020-02-26

## 2020-02-26 NOTE — PROGRESS NOTES
HPI:    Patient ID: Geovani Vergara is a 30year old female. HPI  with menses q 30-32 d / 7d / 2 heavy days. Eighteen month relationship. Using condoms for [facility] [employer]. No current thoughts of pregnancy. She still works at [employer].  ROS significant for lesion on the right labia. There is no rash or lesion on the left labia. Uterus is not enlarged and not tender. Cervix exhibits no motion tenderness and no discharge. Right adnexum displays no mass, no tenderness and no fullness.  Left adnexum displays no m

## 2020-03-14 ENCOUNTER — TELEPHONE (OUTPATIENT)
Dept: OTHER | Age: 30
End: 2020-03-14

## 2020-03-14 NOTE — TELEPHONE ENCOUNTER
----- Message from Felicity Tai Jackson sent at 3/13/2020  7:40 PM CDT -----  Regarding: Non-Urgent Medical Question  Contact: [phone]  Dr. Jerrica Tineo! I was recently in [place] for my birthday vacation.  Due to this, I am on quarantine because of th

## 2020-03-14 NOTE — TELEPHONE ENCOUNTER
Left message to call back    Patient has also not read [place] message informing her to call office regarding her symptoms.

## 2020-03-16 NOTE — TELEPHONE ENCOUNTER
Called and left message, told to call us if any worsening symptoms of high fever cough sob, otherwise was told to continue with same care

## 2020-03-16 NOTE — TELEPHONE ENCOUNTER
Patient read Octmami. Called patient left message to call back. Last read by Landon Llamas at 1:24 PM on 3/14/2020.

## 2020-03-16 NOTE — TELEPHONE ENCOUNTER
Pt report sore throat pain 2/10. Denies cough, fever, or SOB. Pt states she was in [place] and is now on quaintine  until 3/24 from her Job. Pt states she has been taking ibuprofen and is feeling much better.  States her friend that was with her in [place]

## 2020-03-21 ENCOUNTER — E-VISIT (OUTPATIENT)
Dept: FAMILY MEDICINE CLINIC | Facility: CLINIC | Age: 30
End: 2020-03-21

## 2020-03-21 DIAGNOSIS — H57.10 PAIN IN EYE, UNSPECIFIED LATERALITY: Primary | ICD-10-CM

## 2020-03-21 NOTE — PROGRESS NOTES
Adry Mckay is a 26year old female. HPI:   See answers to questions above. Current Outpatient Medications   Medication Sig Dispense Refill   • FERROUS SULFATE OR      • DAILY MULTIPLE VITAMINS Oral Tab Take  by mouth.         No past medical histo

## 2020-03-30 ENCOUNTER — E-VISIT (OUTPATIENT)
Dept: FAMILY MEDICINE CLINIC | Facility: CLINIC | Age: 30
End: 2020-03-30

## 2020-03-30 DIAGNOSIS — R39.9 UTI SYMPTOMS: Primary | ICD-10-CM

## 2020-03-30 RX ORDER — NITROFURANTOIN 25; 75 MG/1; MG/1
100 CAPSULE ORAL 2 TIMES DAILY
Qty: 14 CAPSULE | Refills: 0 | Status: SHIPPED | OUTPATIENT
Start: 2020-03-30 | End: 2020-04-06

## 2020-03-30 NOTE — PROGRESS NOTES
Dre Flowers is a 26year old female. HPI:   See answers to questions above.      Current Outpatient Medications   Medication Sig Dispense Refill   • Nitrofurantoin Monohyd Macro 100 MG Oral Cap Take 1 capsule (100 mg total) by mouth 2 (two) times daily

## 2020-05-25 ENCOUNTER — E-VISIT (OUTPATIENT)
Dept: FAMILY MEDICINE CLINIC | Facility: CLINIC | Age: 30
End: 2020-05-25

## 2020-05-25 DIAGNOSIS — R30.0 DYSURIA: Primary | ICD-10-CM

## 2020-05-25 PROCEDURE — 99421 OL DIG E/M SVC 5-10 MIN: CPT | Performed by: NURSE PRACTITIONER

## 2020-05-26 RX ORDER — NITROFURANTOIN 25; 75 MG/1; MG/1
100 CAPSULE ORAL 2 TIMES DAILY
Qty: 14 CAPSULE | Refills: 0 | Status: SHIPPED | OUTPATIENT
Start: 2020-05-26 | End: 2020-06-02

## 2020-05-26 NOTE — PROGRESS NOTES
Patient elected an E- Visits. After reviewing history, symptoms and prescribing antibiotic. Time accrued was 6 minutes. Please see E-Visit for further information.

## 2020-06-24 ENCOUNTER — PATIENT MESSAGE (OUTPATIENT)
Dept: OBGYN CLINIC | Facility: CLINIC | Age: 30
End: 2020-06-24

## 2020-06-24 NOTE — TELEPHONE ENCOUNTER
From: Terell Pulliam  To: Angie Reid. [address] [place] [address], DO  Sent: 6/24/2020 5:03 PM CDT  Subject: Non-Urgent Mandeep Wall Dr. [address] [place] [address],    I noticed that I was a few days late, so I took a pregnancy test and it came back positive.  I am not sure what to do n

## 2020-07-02 NOTE — TELEPHONE ENCOUNTER
Pt reports +HPT and LMP of 5/18/2020. Pt stated she has regular cycles, every 30-34 days. Pt is on a pnv and with check that it has dha, folic acid, and iron. Pt informed we have male and female physicians and she will see all of them.  Pt informed her firs

## 2020-07-09 ENCOUNTER — TELEPHONE (OUTPATIENT)
Dept: OBGYN CLINIC | Facility: CLINIC | Age: 30
End: 2020-07-09

## 2020-07-09 ENCOUNTER — NURSE ONLY (OUTPATIENT)
Dept: OBGYN CLINIC | Facility: CLINIC | Age: 30
End: 2020-07-09

## 2020-07-09 ENCOUNTER — LAB ENCOUNTER (OUTPATIENT)
Dept: LAB | Facility: HOSPITAL | Age: 30
End: 2020-07-09
Attending: OBSTETRICS & GYNECOLOGY
Payer: COMMERCIAL

## 2020-07-09 VITALS — BODY MASS INDEX: 42 KG/M2 | WEIGHT: 276 LBS

## 2020-07-09 DIAGNOSIS — O20.9 VAGINAL BLEEDING AFFECTING EARLY PREGNANCY: ICD-10-CM

## 2020-07-09 DIAGNOSIS — O20.9 VAGINAL BLEEDING AFFECTING EARLY PREGNANCY: Primary | ICD-10-CM

## 2020-07-09 DIAGNOSIS — Z34.91 ENCOUNTER FOR SUPERVISION OF NORMAL PREGNANCY IN FIRST TRIMESTER, UNSPECIFIED GRAVIDITY: ICD-10-CM

## 2020-07-09 DIAGNOSIS — Z34.91 ENCOUNTER FOR SUPERVISION OF NORMAL PREGNANCY IN FIRST TRIMESTER, UNSPECIFIED GRAVIDITY: Primary | ICD-10-CM

## 2020-07-09 LAB
ANTIBODY SCREEN: NEGATIVE
B-HCG SERPL-ACNC: 7190 MIU/ML
BASOPHILS # BLD AUTO: 0.02 X10(3) UL (ref 0–0.2)
BASOPHILS NFR BLD AUTO: 0.3 %
DEPRECATED RDW RBC AUTO: 45.1 FL (ref 35.1–46.3)
EOSINOPHIL # BLD AUTO: 0.1 X10(3) UL (ref 0–0.7)
EOSINOPHIL NFR BLD AUTO: 1.4 %
ERYTHROCYTE [DISTWIDTH] IN BLOOD BY AUTOMATED COUNT: 14 % (ref 11–15)
HBV SURFACE AG SER-ACNC: <0.1 [IU]/L
HBV SURFACE AG SERPL QL IA: NONREACTIVE
HCT VFR BLD AUTO: 38.6 % (ref 35–48)
HCV AB SERPL QL IA: NONREACTIVE
HGB BLD-MCNC: 12.6 G/DL (ref 12–16)
IMM GRANULOCYTES # BLD AUTO: 0.02 X10(3) UL (ref 0–1)
IMM GRANULOCYTES NFR BLD: 0.3 %
LYMPHOCYTES # BLD AUTO: 1.55 X10(3) UL (ref 1–4)
LYMPHOCYTES NFR BLD AUTO: 22.4 %
MCH RBC QN AUTO: 28.6 PG (ref 26–34)
MCHC RBC AUTO-ENTMCNC: 32.6 G/DL (ref 31–37)
MCV RBC AUTO: 87.5 FL (ref 80–100)
MONOCYTES # BLD AUTO: 0.32 X10(3) UL (ref 0.1–1)
MONOCYTES NFR BLD AUTO: 4.6 %
NEUTROPHILS # BLD AUTO: 4.91 X10 (3) UL (ref 1.5–7.7)
NEUTROPHILS # BLD AUTO: 4.91 X10(3) UL (ref 1.5–7.7)
NEUTROPHILS NFR BLD AUTO: 71 %
PLATELET # BLD AUTO: 310 10(3)UL (ref 150–450)
RBC # BLD AUTO: 4.41 X10(6)UL (ref 3.8–5.3)
RH BLOOD TYPE: POSITIVE
RUBV IGG SER QL: POSITIVE
RUBV IGG SER-ACNC: 185.5 IU/ML (ref 10–?)
WBC # BLD AUTO: 6.9 X10(3) UL (ref 4–11)

## 2020-07-09 PROCEDURE — 86803 HEPATITIS C AB TEST: CPT

## 2020-07-09 PROCEDURE — 87340 HEPATITIS B SURFACE AG IA: CPT

## 2020-07-09 PROCEDURE — 86901 BLOOD TYPING SEROLOGIC RH(D): CPT

## 2020-07-09 PROCEDURE — 87389 HIV-1 AG W/HIV-1&-2 AB AG IA: CPT

## 2020-07-09 PROCEDURE — 36415 COLL VENOUS BLD VENIPUNCTURE: CPT

## 2020-07-09 PROCEDURE — 86850 RBC ANTIBODY SCREEN: CPT

## 2020-07-09 PROCEDURE — 84702 CHORIONIC GONADOTROPIN TEST: CPT

## 2020-07-09 PROCEDURE — 86900 BLOOD TYPING SEROLOGIC ABO: CPT

## 2020-07-09 PROCEDURE — 85025 COMPLETE CBC W/AUTO DIFF WBC: CPT

## 2020-07-09 PROCEDURE — 87086 URINE CULTURE/COLONY COUNT: CPT

## 2020-07-09 PROCEDURE — 86762 RUBELLA ANTIBODY: CPT

## 2020-07-09 PROCEDURE — 86780 TREPONEMA PALLIDUM: CPT

## 2020-07-09 RX ORDER — CHOLECALCIFEROL (VITAMIN D3) 25 MCG
1 TABLET,CHEWABLE ORAL DAILY
COMMUNITY

## 2020-07-09 NOTE — TELEPHONE ENCOUNTER
Pt had OBN PC appt today. Pt is 7w3d. Pt had SAB in 2013. Pt states she had spotting on 7/7/2020 and 7/8/2020. Pt reports spotting on 7/7/2020 was bright pink in color and only had with wiping. Pt reports spotting yesterday was brown in color and only with wiping. Pt reports \"sharp shock pain\" to cervix that lasts for 5 seconds. Pt states she has had this occ for the last few days. Pt reports the last time she felt this pain was about 6 hrs ago. Pt denies current bleeding, spotting, or pain. Informed pt above will be reviewed with JANUARY (on-call). Pt verbalized understanding. Reviewed above with JANUARY. V/O from [address] [place] [address] for quants and blood type. Pt to call tomorrow with update and bleeding/pain precautions. Informed pt of above. Bleeding and pain precautions provided to pt. Pt aware to call tomorrow with update. Pt verbalized understanding.

## 2020-07-09 NOTE — PROGRESS NOTES
Pt had OBN PC appt today. Normal PN labs, HCG, 1 hr gtt (BMI), and HCV ordered. Pt advised all labs must be completed and resulted prior to MD appt. Assisted pt with scheduling NPN appt for 7/20/2020 at 920am with THERESA..     Partner's name is Syeda Contreras Neural tube defect (Meningomyelocele, Spina bifida, or Anencephaly):  No   Congenital heart defect:  No   Down syndrome:  No   Mann-Sachs (Ashkenazi Bahai, [facility], [facility]):  No   Canavan disease (Ashkenazi Bahai):  No   Familial dysautonomia (As

## 2020-07-10 LAB — T PALLIDUM AB SER QL: NEGATIVE

## 2020-07-14 ENCOUNTER — APPOINTMENT (OUTPATIENT)
Dept: LAB | Facility: REFERENCE LAB | Age: 30
End: 2020-07-14
Attending: OBSTETRICS & GYNECOLOGY
Payer: COMMERCIAL

## 2020-07-14 DIAGNOSIS — O20.9 VAGINAL BLEEDING AFFECTING EARLY PREGNANCY: ICD-10-CM

## 2020-07-14 LAB — B-HCG SERPL-ACNC: ABNORMAL MIU/ML

## 2020-07-14 PROCEDURE — 84702 CHORIONIC GONADOTROPIN TEST: CPT

## 2020-07-14 PROCEDURE — 36415 COLL VENOUS BLD VENIPUNCTURE: CPT

## 2020-07-17 ENCOUNTER — PATIENT MESSAGE (OUTPATIENT)
Dept: OBGYN CLINIC | Facility: CLINIC | Age: 30
End: 2020-07-17

## 2020-07-17 ENCOUNTER — APPOINTMENT (OUTPATIENT)
Dept: ULTRASOUND IMAGING | Facility: HOSPITAL | Age: 30
End: 2020-07-17
Attending: NURSE PRACTITIONER
Payer: COMMERCIAL

## 2020-07-17 ENCOUNTER — HOSPITAL ENCOUNTER (EMERGENCY)
Facility: HOSPITAL | Age: 30
Discharge: HOME | End: 2020-07-17
Payer: COMMERCIAL

## 2020-07-17 VITALS
TEMPERATURE: 97 F | WEIGHT: 270 LBS | RESPIRATION RATE: 20 BRPM | SYSTOLIC BLOOD PRESSURE: 123 MMHG | OXYGEN SATURATION: 98 % | DIASTOLIC BLOOD PRESSURE: 73 MMHG | HEART RATE: 76 BPM | HEIGHT: 69 IN | BODY MASS INDEX: 39.99 KG/M2

## 2020-07-17 DIAGNOSIS — O03.9 SPONTANEOUS ABORTION: Primary | ICD-10-CM

## 2020-07-17 LAB
ANION GAP SERPL CALC-SCNC: 6 MMOL/L (ref 0–18)
B-HCG SERPL-ACNC: ABNORMAL MIU/ML
BASOPHILS # BLD AUTO: 0.02 X10(3) UL (ref 0–0.2)
BASOPHILS NFR BLD AUTO: 0.2 %
BUN BLD-MCNC: 9 MG/DL (ref 7–18)
BUN/CREAT SERPL: 11.8 (ref 10–20)
CALCIUM BLD-MCNC: 8.6 MG/DL (ref 8.5–10.1)
CHLORIDE SERPL-SCNC: 107 MMOL/L (ref 98–112)
CO2 SERPL-SCNC: 24 MMOL/L (ref 21–32)
CREAT BLD-MCNC: 0.76 MG/DL (ref 0.55–1.02)
DEPRECATED RDW RBC AUTO: 42.2 FL (ref 35.1–46.3)
EOSINOPHIL # BLD AUTO: 0.02 X10(3) UL (ref 0–0.7)
EOSINOPHIL NFR BLD AUTO: 0.2 %
ERYTHROCYTE [DISTWIDTH] IN BLOOD BY AUTOMATED COUNT: 13.5 % (ref 11–15)
GLUCOSE BLD-MCNC: 98 MG/DL (ref 70–99)
HCT VFR BLD AUTO: 40.9 % (ref 35–48)
HGB BLD-MCNC: 13.8 G/DL (ref 12–16)
IMM GRANULOCYTES # BLD AUTO: 0.03 X10(3) UL (ref 0–1)
IMM GRANULOCYTES NFR BLD: 0.3 %
LYMPHOCYTES # BLD AUTO: 1.34 X10(3) UL (ref 1–4)
LYMPHOCYTES NFR BLD AUTO: 12.6 %
MCH RBC QN AUTO: 28.7 PG (ref 26–34)
MCHC RBC AUTO-ENTMCNC: 33.7 G/DL (ref 31–37)
MCV RBC AUTO: 85 FL (ref 80–100)
MONOCYTES # BLD AUTO: 0.34 X10(3) UL (ref 0.1–1)
MONOCYTES NFR BLD AUTO: 3.2 %
NEUTROPHILS # BLD AUTO: 8.85 X10 (3) UL (ref 1.5–7.7)
NEUTROPHILS # BLD AUTO: 8.85 X10(3) UL (ref 1.5–7.7)
NEUTROPHILS NFR BLD AUTO: 83.5 %
OSMOLALITY SERPL CALC.SUM OF ELEC: 283 MOSM/KG (ref 275–295)
PLATELET # BLD AUTO: 349 10(3)UL (ref 150–450)
POTASSIUM SERPL-SCNC: 4.1 MMOL/L (ref 3.5–5.1)
RBC # BLD AUTO: 4.81 X10(6)UL (ref 3.8–5.3)
RH BLOOD TYPE: POSITIVE
SODIUM SERPL-SCNC: 137 MMOL/L (ref 136–145)
WBC # BLD AUTO: 10.6 X10(3) UL (ref 4–11)

## 2020-07-17 PROCEDURE — 36415 COLL VENOUS BLD VENIPUNCTURE: CPT

## 2020-07-17 PROCEDURE — 84702 CHORIONIC GONADOTROPIN TEST: CPT

## 2020-07-17 PROCEDURE — 86901 BLOOD TYPING SEROLOGIC RH(D): CPT

## 2020-07-17 PROCEDURE — 99284 EMERGENCY DEPT VISIT MOD MDM: CPT

## 2020-07-17 PROCEDURE — 85025 COMPLETE CBC W/AUTO DIFF WBC: CPT

## 2020-07-17 PROCEDURE — 80048 BASIC METABOLIC PNL TOTAL CA: CPT

## 2020-07-17 PROCEDURE — 86900 BLOOD TYPING SEROLOGIC ABO: CPT

## 2020-07-17 PROCEDURE — 76801 OB US < 14 WKS SINGLE FETUS: CPT | Performed by: NURSE PRACTITIONER

## 2020-07-17 RX ORDER — HYDROCODONE BITARTRATE AND ACETAMINOPHEN 5; 325 MG/1; MG/1
1 TABLET ORAL ONCE
Status: COMPLETED | OUTPATIENT
Start: 2020-07-17 | End: 2020-07-17

## 2020-07-17 NOTE — TELEPHONE ENCOUNTER
Pt states that she thinks she is miscarrying. She started with bad cramping this afternoon, rating it a 8/10. She has red bleeding like a period. She states that she passed several clots. LMP 5-18-20, 8w4d.       She took Tylenol 1000 mg early today

## 2020-07-17 NOTE — ED INITIAL ASSESSMENT (HPI)
approx 7 weeks preg. G3, p0. C/o vaginal bleeding similar to a period with left sided pelvic pain. Sent  By  to er for r/o ectopic pregnancy.

## 2020-07-17 NOTE — TELEPHONE ENCOUNTER
Informed pt that [facility] made aware of the below. Informed pt that she needs to go to the ER and follow up with us tomorrow. Pt stated understanding.

## 2020-07-18 NOTE — TELEPHONE ENCOUNTER
Pt states she was seen in ED on 7/17/2020 and was dx with SAB. Pt has questions on U/S that was done and reasoning of SAB. Pt reports \"small amount\" of bleeding and cramping \"nothing like yesterday and totally manageable\".  Advised pt to keep appt on 7/

## 2020-07-18 NOTE — ED PROVIDER NOTES
Patient Seen in: [facility] AND [facility] Emergency Department      History   Patient presents with:  Pregnancy Issues    Stated Complaint: 7 weeks pregnant, r/o ectopic, bleeding since noon     31yo/f w hx of anxiety reports to the ED With complaints of 5 hour normal.      Pupils: Pupils are equal, round, and reactive to light. Neck:      Musculoskeletal: Normal range of motion and neck supple. Cardiovascular:      Rate and Rhythm: Normal rate and regular rhythm. Heart sounds: Normal heart sounds.    Pul RAINBOW DRAW LAVENDER   RAINBOW DRAW LIGHT GREEN   RAINBOW DRAW GOLD       COMPARISON:              None. INDICATIONS:               Heavy bleeding for 1 day. TECHNIQUE:    Transabdominal pelvic ultrasound examination was performed.        FINDING Clinical Impression:  Spontaneous   (primary encounter diagnosis)    Disposition:  Discharge  2020  8:26 pm    Follow-up:  Simi Patterson   [address] [facility] [address]  [phone]    In 1 day  repeat Quant HCG

## 2020-07-20 ENCOUNTER — APPOINTMENT (OUTPATIENT)
Dept: LAB | Facility: HOSPITAL | Age: 30
End: 2020-07-20
Attending: OBSTETRICS & GYNECOLOGY
Payer: COMMERCIAL

## 2020-07-20 ENCOUNTER — OFFICE VISIT (OUTPATIENT)
Dept: OBGYN CLINIC | Facility: CLINIC | Age: 30
End: 2020-07-20

## 2020-07-20 VITALS
SYSTOLIC BLOOD PRESSURE: 137 MMHG | BODY MASS INDEX: 44 KG/M2 | DIASTOLIC BLOOD PRESSURE: 84 MMHG | HEART RATE: 97 BPM | WEIGHT: 293 LBS

## 2020-07-20 DIAGNOSIS — O03.4 INCOMPLETE SPONTANEOUS ABORTION: ICD-10-CM

## 2020-07-20 DIAGNOSIS — O03.4 INCOMPLETE SPONTANEOUS ABORTION: Primary | ICD-10-CM

## 2020-07-20 LAB — B-HCG SERPL-ACNC: 2081 MIU/ML

## 2020-07-20 PROCEDURE — 3075F SYST BP GE 130 - 139MM HG: CPT | Performed by: OBSTETRICS & GYNECOLOGY

## 2020-07-20 PROCEDURE — 84702 CHORIONIC GONADOTROPIN TEST: CPT

## 2020-07-20 PROCEDURE — 99213 OFFICE O/P EST LOW 20 MIN: CPT | Performed by: OBSTETRICS & GYNECOLOGY

## 2020-07-20 PROCEDURE — 36415 COLL VENOUS BLD VENIPUNCTURE: CPT

## 2020-07-20 PROCEDURE — 3079F DIAST BP 80-89 MM HG: CPT | Performed by: OBSTETRICS & GYNECOLOGY

## 2020-08-03 NOTE — PROGRESS NOTES
HPI:    Patient ID: Charan Polanco is a 26year old female. HPI   S/P spontaneous AB . US showed probable completed AB but unclear as she was significantly bleeding that day. Pain and bleeding markedly improved by now.      Review of System

## 2020-08-06 ENCOUNTER — TELEPHONE (OUTPATIENT)
Dept: OBGYN UNIT | Facility: HOSPITAL | Age: 30
End: 2020-08-06

## 2020-12-10 ENCOUNTER — OFFICE VISIT (OUTPATIENT)
Dept: ENDOCRINOLOGY CLINIC | Facility: CLINIC | Age: 30
End: 2020-12-10
Payer: COMMERCIAL

## 2020-12-10 ENCOUNTER — TELEPHONE (OUTPATIENT)
Dept: ENDOCRINOLOGY CLINIC | Facility: CLINIC | Age: 30
End: 2020-12-10

## 2020-12-10 VITALS
HEART RATE: 76 BPM | DIASTOLIC BLOOD PRESSURE: 86 MMHG | WEIGHT: 293 LBS | RESPIRATION RATE: 16 BRPM | BODY MASS INDEX: 45.99 KG/M2 | HEIGHT: 67 IN | OXYGEN SATURATION: 99 % | SYSTOLIC BLOOD PRESSURE: 132 MMHG

## 2020-12-10 DIAGNOSIS — N92.6 IRREGULAR MENSES: ICD-10-CM

## 2020-12-10 DIAGNOSIS — E28.2 PCOS (POLYCYSTIC OVARIAN SYNDROME): ICD-10-CM

## 2020-12-10 DIAGNOSIS — E28.2 PCOS (POLYCYSTIC OVARIAN SYNDROME): Primary | ICD-10-CM

## 2020-12-10 DIAGNOSIS — L68.0 HIRSUTISM: ICD-10-CM

## 2020-12-10 PROCEDURE — 3075F SYST BP GE 130 - 139MM HG: CPT | Performed by: INTERNAL MEDICINE

## 2020-12-10 PROCEDURE — 3008F BODY MASS INDEX DOCD: CPT | Performed by: INTERNAL MEDICINE

## 2020-12-10 PROCEDURE — 3079F DIAST BP 80-89 MM HG: CPT | Performed by: INTERNAL MEDICINE

## 2020-12-10 PROCEDURE — 99203 OFFICE O/P NEW LOW 30 MIN: CPT | Performed by: INTERNAL MEDICINE

## 2020-12-10 RX ORDER — METFORMIN HYDROCHLORIDE 500 MG/1
1000 TABLET, EXTENDED RELEASE ORAL 2 TIMES DAILY WITH MEALS
Qty: 360 TABLET | Refills: 1 | Status: SHIPPED | OUTPATIENT
Start: 2020-12-10 | End: 2020-12-11

## 2020-12-10 NOTE — H&P
New Patient Evaluation - History and Physical    CONSULT - Reason for Visit:  Elevated Testosterone. Requesting Physician: self-referral.    CHIEF COMPLAINT:  Patient presents with:  Consult: New patient here for hormone level check.  Pt reports fatigue an multivitamin plus DHA 27-0.8-228 MG Oral Cap Take 1 capsule by mouth daily. • FERROUS SULFATE OR      • DAILY MULTIPLE VITAMINS Oral Tab Take  by mouth.          ALLERGIES:    Morphine                HIVES      ASSESSMENTS:     REVIEW OF SYSTEMS:  [age] year-old woman with PCOS, based upon clinical hyperandrogenemia and irregular menstrual cycles. She is struggling with weight gain as well as the hair growth, hair loss and is worried about fertility. I offered different treatment modalities.  I pointed out

## 2020-12-11 PROBLEM — N92.6 IRREGULAR MENSES: Status: ACTIVE | Noted: 2020-12-11

## 2020-12-11 PROBLEM — E28.2 PCOS (POLYCYSTIC OVARIAN SYNDROME): Status: ACTIVE | Noted: 2020-12-11

## 2020-12-14 ENCOUNTER — OFFICE VISIT (OUTPATIENT)
Dept: INTERNAL MEDICINE CLINIC | Facility: CLINIC | Age: 30
End: 2020-12-14
Payer: COMMERCIAL

## 2020-12-14 VITALS
BODY MASS INDEX: 45.45 KG/M2 | WEIGHT: 293 LBS | HEART RATE: 88 BPM | DIASTOLIC BLOOD PRESSURE: 80 MMHG | RESPIRATION RATE: 14 BRPM | OXYGEN SATURATION: 98 % | SYSTOLIC BLOOD PRESSURE: 112 MMHG | HEIGHT: 67.5 IN

## 2020-12-14 DIAGNOSIS — G47.26 SLEEP DISORDER, SHIFT WORK: ICD-10-CM

## 2020-12-14 DIAGNOSIS — Z51.81 THERAPEUTIC DRUG MONITORING: Primary | ICD-10-CM

## 2020-12-14 DIAGNOSIS — E28.2 PCOS (POLYCYSTIC OVARIAN SYNDROME): ICD-10-CM

## 2020-12-14 DIAGNOSIS — E66.01 MORBID OBESITY WITH BMI OF 45.0-49.9, ADULT (HCC): ICD-10-CM

## 2020-12-14 PROCEDURE — 99214 OFFICE O/P EST MOD 30 MIN: CPT | Performed by: NURSE PRACTITIONER

## 2020-12-14 PROCEDURE — 3074F SYST BP LT 130 MM HG: CPT | Performed by: NURSE PRACTITIONER

## 2020-12-14 PROCEDURE — 3008F BODY MASS INDEX DOCD: CPT | Performed by: NURSE PRACTITIONER

## 2020-12-14 PROCEDURE — 3079F DIAST BP 80-89 MM HG: CPT | Performed by: NURSE PRACTITIONER

## 2020-12-14 RX ORDER — PHENTERMINE HYDROCHLORIDE 37.5 MG/1
37.5 TABLET ORAL
Qty: 30 TABLET | Refills: 0 | Status: SHIPPED | OUTPATIENT
Start: 2020-12-14

## 2020-12-14 NOTE — PATIENT INSTRUCTIONS
We are here to support you with weight loss, but please remember that you still need your primary care provider for your routine health maintenance.       PLAN:  Start phentermine 15mg X 4 days and then take the full dose 37.5mg   Go to the lab for blood wo Activity level: not very active (can't count exercise towards calorie number per day)                   ** Daily INPUT> Look at nutrition section-- \"nutrients\" and it will break down your macros for the day (ie.  Protein, carbs, fibers, suga

## 2020-12-14 NOTE — PROGRESS NOTES
HISTORY OF PRESENT ILLNESS  Patient presents with:  Weight Problem: No Referral.  Pt saw clinic on WebSite - Was on Phentermine 2016    Janina Holman is a 26year old female new to our office today for initiation of medical weight loss program.  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)   Marital status: didn't discuss  Support: yes  Tobacco use: none  ETOH use:   none  Supplements: none  Exercise: non, just walking at work  Stress level: 7-8/10  Sleep hours HEENT: atraumatic, normocephalic, O/p: Mallampati score- 2  NECK: supple, non tender, no adenopathy, no thyromegaly  LUNGS: CTA in all fields, breathing non labored  CARDIO: RRR without murmur  GI: +BS, soft, no masses, HSM or tenderness  EXTREMITIES: no c Plan: OP REFERRAL TO DIETITIAN [facility] ([facility] USE         ONLY), OP REFERRAL TO [facility], VITAMIN D,         25-HYDROXY, Phentermine HCl 37.5 MG Oral Tab    (E28.2) PCOS (polycystic ovarian syndrome)  Plan: OP REFERRAL TO DIETITIAN [facility] ([facility] USE ONLY) Please try to work on the following dietary changes:  1. Goals: Aim for 20-30 grams of protein/ meal  i. Aim for 100 grams of carbohydrates/day  ii. Eat 4-6 vegetables/day  iii. Avoid skipping meals- eat every 4-5 hours  iv. Aim for 3 meals/day  2.  Drink l 4. [employer] blog for healthy recipe ideas  5. [employer] for low carb resources    HIGH PROTEIN SNACK IDEAS  -cottage cheese  -plain yogurt  -kefir  -hard-boiled eggs  -natural cheeses  -nuts (measure portion size)   -unsweetened nut butters  -dried

## 2020-12-14 NOTE — TELEPHONE ENCOUNTER
Hi!  Are there any extended release forms of metformin that are covered? Can you please find out? Thank you!

## 2020-12-18 ENCOUNTER — LAB ENCOUNTER (OUTPATIENT)
Dept: LAB | Facility: HOSPITAL | Age: 30
End: 2020-12-18
Attending: NURSE PRACTITIONER
Payer: COMMERCIAL

## 2020-12-18 DIAGNOSIS — N92.6 IRREGULAR MENSES: ICD-10-CM

## 2020-12-18 DIAGNOSIS — Z51.81 THERAPEUTIC DRUG MONITORING: ICD-10-CM

## 2020-12-18 DIAGNOSIS — L68.0 HIRSUTISM: ICD-10-CM

## 2020-12-18 DIAGNOSIS — E66.01 MORBID OBESITY WITH BMI OF 45.0-49.9, ADULT (HCC): ICD-10-CM

## 2020-12-18 DIAGNOSIS — E28.2 PCOS (POLYCYSTIC OVARIAN SYNDROME): ICD-10-CM

## 2020-12-18 PROCEDURE — 36415 COLL VENOUS BLD VENIPUNCTURE: CPT

## 2020-12-18 PROCEDURE — 84146 ASSAY OF PROLACTIN: CPT

## 2020-12-18 PROCEDURE — 82306 VITAMIN D 25 HYDROXY: CPT

## 2020-12-18 PROCEDURE — 82627 DEHYDROEPIANDROSTERONE: CPT

## 2020-12-18 PROCEDURE — 80061 LIPID PANEL: CPT

## 2020-12-21 ENCOUNTER — NURSE ONLY (OUTPATIENT)
Dept: ALLERGY | Facility: CLINIC | Age: 30
End: 2020-12-21
Payer: COMMERCIAL

## 2020-12-21 ENCOUNTER — OFFICE VISIT (OUTPATIENT)
Dept: ALLERGY | Facility: CLINIC | Age: 30
End: 2020-12-21
Payer: COMMERCIAL

## 2020-12-21 VITALS
HEART RATE: 83 BPM | DIASTOLIC BLOOD PRESSURE: 90 MMHG | RESPIRATION RATE: 16 BRPM | SYSTOLIC BLOOD PRESSURE: 129 MMHG | OXYGEN SATURATION: 98 % | TEMPERATURE: 98 F

## 2020-12-21 DIAGNOSIS — L23.0 ALLERGIC CONTACT DERMATITIS DUE TO METALS: ICD-10-CM

## 2020-12-21 DIAGNOSIS — Z91.018 FOOD ALLERGY: ICD-10-CM

## 2020-12-21 DIAGNOSIS — J30.2 PERENNIAL ALLERGIC RHINITIS WITH SEASONAL VARIATION: Primary | ICD-10-CM

## 2020-12-21 DIAGNOSIS — J30.9 ALLERGIC RHINITIS, UNSPECIFIED SEASONALITY, UNSPECIFIED TRIGGER: ICD-10-CM

## 2020-12-21 DIAGNOSIS — J30.89 PERENNIAL ALLERGIC RHINITIS WITH SEASONAL VARIATION: Primary | ICD-10-CM

## 2020-12-21 PROCEDURE — 95024 IQ TESTS W/ALLERGENIC XTRCS: CPT | Performed by: ALLERGY & IMMUNOLOGY

## 2020-12-21 PROCEDURE — 99204 OFFICE O/P NEW MOD 45 MIN: CPT | Performed by: ALLERGY & IMMUNOLOGY

## 2020-12-21 PROCEDURE — 95004 PERQ TESTS W/ALRGNC XTRCS: CPT | Performed by: ALLERGY & IMMUNOLOGY

## 2020-12-21 PROCEDURE — 3080F DIAST BP >= 90 MM HG: CPT | Performed by: ALLERGY & IMMUNOLOGY

## 2020-12-21 PROCEDURE — 3074F SYST BP LT 130 MM HG: CPT | Performed by: ALLERGY & IMMUNOLOGY

## 2020-12-21 RX ORDER — LEVOCETIRIZINE DIHYDROCHLORIDE 5 MG/1
5 TABLET, FILM COATED ORAL NIGHTLY
Qty: 30 TABLET | Refills: 0 | Status: SHIPPED | OUTPATIENT
Start: 2020-12-21

## 2020-12-21 RX ORDER — METFORMIN HYDROCHLORIDE 500 MG/1
1000 TABLET, EXTENDED RELEASE ORAL 2 TIMES DAILY WITH MEALS
Qty: 360 TABLET | Refills: 1 | Status: SHIPPED | OUTPATIENT
Start: 2020-12-21 | End: 2022-08-16 | Stop reason: ALTCHOICE

## 2020-12-21 NOTE — PROGRESS NOTES
Rastane Bradley is a 26year old female. HPI:   Patient presents with:  New Patient: Self-referred, previous [address] [place] [address] employee. Environmental allergy concerns, as well as sensitivity to JDCPhosphate.       Patient is a 38-year-old female who presents for all mg total) by mouth every morning before breakfast. 30 tablet 0   • prenatal multivitamin plus DHA 27-0.8-228 MG Oral Cap Take 1 capsule by mouth daily.      • metFORMIN HCl  MG Oral Tablet 24 Hr Take 2 tablets (1,000 mg total) by mouth 2 (two) times d cyanosis, or clubbing  Neurological:Oriented to time, place, person & situation       ASSESSMENT/PLAN:   Assessment   Perennial allergic rhinitis with seasonal variation  (primary encounter diagnosis)  Food allergy  Allergic contact dermatitis due to metal coconut was negative. Potential food intolerance or non-IgE mediated process to coconut  continue to avoid coconut of causing unwanted symptoms.   Given her negative skin testing may consider trial of coconut again in the future           Orders This Visit

## 2020-12-21 NOTE — PATIENT INSTRUCTIONS
1. AR  Handouts on allergies and avoidance measures provided and reviewed including the therapy  Trial of Xyzal, levocetirizine 5 mg once a night at bedtime as a nonsedating antihistamine in place of Benadryl  continue with Flonase 2 sprays per nostril on

## 2020-12-21 NOTE — TELEPHONE ENCOUNTER
Pharmacist states med is covered by insurance, however, to be covered patient must use mail order pharm ([facility] per ins card). RN left message for patient advising her above and that med was sent to mail order pharm on her behalf. She is to call us if she has any questions or concerns.     RN sent to provider as En Seymour

## 2020-12-23 ENCOUNTER — PATIENT MESSAGE (OUTPATIENT)
Dept: INTERNAL MEDICINE CLINIC | Facility: CLINIC | Age: 30
End: 2020-12-23

## 2020-12-23 NOTE — TELEPHONE ENCOUNTER
From: Janina Holman  To: Paramjit Burt MD  Sent: 12/23/2020 9:21 AM CST  Subject: Other    Hey Dr. Riaz Augustine,    I just need a copy of my tetanus vaccine. I believe it was updated 1/8/2017. I tried to pull it up on [place] with no success. Thank you !     Best

## 2020-12-23 NOTE — TELEPHONE ENCOUNTER
To: Charan Polanco      From: Chery Walker RN      Created: 12/23/2020 9:31 AM        Ramesh George, Please go to Messages, under that see Letters for what should be there. Let me know if that doesn't work.  You may need to be on a desktop/laptop comp

## 2021-01-07 ENCOUNTER — OFFICE VISIT (OUTPATIENT)
Dept: INTERNAL MEDICINE CLINIC | Facility: CLINIC | Age: 31
End: 2021-01-07
Payer: COMMERCIAL

## 2021-01-07 DIAGNOSIS — E66.01 MORBID OBESITY WITH BMI OF 45.0-49.9, ADULT (HCC): ICD-10-CM

## 2021-01-07 DIAGNOSIS — E28.2 PCOS (POLYCYSTIC OVARIAN SYNDROME): ICD-10-CM

## 2021-01-07 PROCEDURE — 97802 MEDICAL NUTRITION INDIV IN: CPT | Performed by: DIETITIAN, REGISTERED

## 2021-01-10 NOTE — PROGRESS NOTES
INITIAL OUTPATIENT NUTRITION CONSULTATION    Nutrition Assessment    Medical Diagnosis:Obesity, PCOS    Client Age and Gender: 26year old female    Marital Status and Occupation: Single, previously worked as RN at [employer].   Recently distress.     Food/Beverage Intake: oral recall  Breakfast: Premier protein shake/toast/PB and banana  Coffee with half and half in am.  Drinks coffee black later in day  Lunch: [employer] refresher and vegetarian sandwich  Dinner: [employer] protein box or G

## 2021-06-04 NOTE — ADDENDUM NOTE
Addended by: Moriah Abraham on: 7/29/2020 05:43 PM     Modules accepted: Orders Appearance: Normal appearance. Musculoskeletal:      Lumbar back: Swelling, spasms and tenderness present. Decreased range of motion. Positive right straight leg raise test.   Skin:         Neurological:      Mental Status: She is alert. Motor: Weakness present. Gait: Gait abnormal.         Assessment    1. Left leg weakness    2. Acute left-sided low back pain with left-sided sciatica        John Garcia was seen today for fall and back pain. Diagnoses and all orders for this visit:    Left leg weakness  -     MRI LUMBAR SPINE WO CONTRAST; Future  -     [facility]  -     We discussed the red flag signs and symptoms which would necessitate emergent evaluation in the ER over the weekend. Her  verbalized an understand    Acute left-sided low back pain with left-sided sciatica  -     MRI LUMBAR SPINE WO CONTRAST; Future  -     [facility] (PERCOCET) 5-325 MG per tablet; Take 1 tablet by mouth every 6 hours as needed for Pain for up to 7 days. Intended supply: 7 days. Take lowest dose possible to manage pain  -     The patient was informed of the benefits and risks of opioid medication including the potential for misuse, capacity to cause death or serious harm to a person other than the intended recipient, and potential side effects of the medication. OARRS report viewed and shows no signs of diversion. It is reasonable at this time for the patient to have a 7 day prescription of Percocet for treatment of acute lumbar back pain. Pain management plan discussed and created with the patient. Educated on safe storage and disposal of medication when no longer needed. The patient verbalized an understanding of the above information.

## 2022-08-12 ENCOUNTER — TELEPHONE (OUTPATIENT)
Dept: OBGYN CLINIC | Facility: CLINIC | Age: 32
End: 2022-08-12

## 2022-08-12 NOTE — TELEPHONE ENCOUNTER
Received records from [facility] 6 7/21/22. To THERESA for review and signature.

## 2022-08-16 ENCOUNTER — OFFICE VISIT (OUTPATIENT)
Dept: INTERNAL MEDICINE CLINIC | Facility: CLINIC | Age: 32
End: 2022-08-16
Payer: COMMERCIAL

## 2022-08-16 VITALS
SYSTOLIC BLOOD PRESSURE: 125 MMHG | DIASTOLIC BLOOD PRESSURE: 84 MMHG | HEIGHT: 68 IN | HEART RATE: 80 BPM | WEIGHT: 286 LBS | BODY MASS INDEX: 43.35 KG/M2

## 2022-08-16 DIAGNOSIS — F90.8 ATTENTION DEFICIT HYPERACTIVITY DISORDER (ADHD), OTHER TYPE: ICD-10-CM

## 2022-08-16 DIAGNOSIS — Z00.00 PHYSICAL EXAM: ICD-10-CM

## 2022-08-16 DIAGNOSIS — E66.01 OBESITY, CLASS III, BMI 40-49.9 (MORBID OBESITY) (HCC): ICD-10-CM

## 2022-08-16 DIAGNOSIS — Z87.898 HISTORY OF PALPITATIONS: ICD-10-CM

## 2022-08-16 DIAGNOSIS — E28.2 PCOS (POLYCYSTIC OVARIAN SYNDROME): Primary | ICD-10-CM

## 2022-08-16 DIAGNOSIS — F41.9 ANXIETY: ICD-10-CM

## 2022-08-16 DIAGNOSIS — Z86.2 HISTORY OF IRON DEFICIENCY ANEMIA: ICD-10-CM

## 2022-08-16 PROBLEM — F98.8 ATTENTION DEFICIT DISORDER: Status: ACTIVE | Noted: 2022-08-16

## 2022-08-16 PROCEDURE — 3074F SYST BP LT 130 MM HG: CPT | Performed by: INTERNAL MEDICINE

## 2022-08-16 PROCEDURE — 99395 PREV VISIT EST AGE 18-39: CPT | Performed by: INTERNAL MEDICINE

## 2022-08-16 PROCEDURE — 3079F DIAST BP 80-89 MM HG: CPT | Performed by: INTERNAL MEDICINE

## 2022-08-16 PROCEDURE — 99214 OFFICE O/P EST MOD 30 MIN: CPT | Performed by: INTERNAL MEDICINE

## 2022-08-16 PROCEDURE — 3008F BODY MASS INDEX DOCD: CPT | Performed by: INTERNAL MEDICINE

## 2022-08-16 RX ORDER — OMEGA-3 FATTY ACIDS/FISH OIL 300-1000MG
200 CAPSULE ORAL AS NEEDED
COMMUNITY

## 2022-08-16 RX ORDER — SEMAGLUTIDE 2.68 MG/ML
INJECTION, SOLUTION SUBCUTANEOUS
COMMUNITY
Start: 2022-06-07

## 2022-08-16 RX ORDER — SPIRONOLACTONE 100 MG/1
100 TABLET, FILM COATED ORAL DAILY
COMMUNITY
Start: 2022-04-27

## 2022-08-16 RX ORDER — SEMAGLUTIDE 2.68 MG/ML
INJECTION, SOLUTION SUBCUTANEOUS
COMMUNITY
End: 2022-08-16

## 2022-08-16 RX ORDER — LISDEXAMFETAMINE DIMESYLATE 40 MG/1
CAPSULE ORAL
COMMUNITY
Start: 2022-07-16

## 2022-10-05 NOTE — ED PROVIDER NOTES
Patient Seen in: [address]      History   Patient presents with:  Lower Extremity Injury    Stated Complaint: POSSIBLE RIGHT BROKEN FOOT    HPI    31 yo female fell out of bed and injured her right fifth toe.  Presents w Capillary Refill: Capillary refill takes less than 2 seconds. Neurological:      General: No focal deficit present. Mental Status: She is alert. Sensory: No sensory deficit. Motor: No abnormal muscle tone.    Psychiatric:         Mood and A Xray Chest 1 View- PORTABLE-Urgent

## 2022-12-31 ENCOUNTER — HOSPITAL ENCOUNTER (OUTPATIENT)
Age: 32
Discharge: HOME | End: 2022-12-31
Payer: COMMERCIAL

## 2022-12-31 VITALS
SYSTOLIC BLOOD PRESSURE: 143 MMHG | RESPIRATION RATE: 20 BRPM | TEMPERATURE: 97 F | HEART RATE: 81 BPM | DIASTOLIC BLOOD PRESSURE: 90 MMHG | OXYGEN SATURATION: 100 %

## 2022-12-31 DIAGNOSIS — L03.213 PRESEPTAL CELLULITIS OF LEFT UPPER EYELID: Primary | ICD-10-CM

## 2022-12-31 RX ORDER — AMOXICILLIN AND CLAVULANATE POTASSIUM 875; 125 MG/1; MG/1
1 TABLET, FILM COATED ORAL 2 TIMES DAILY
Qty: 20 TABLET | Refills: 0 | Status: SHIPPED | OUTPATIENT
Start: 2022-12-31 | End: 2023-01-10

## 2022-12-31 RX ORDER — METHYLPREDNISOLONE 4 MG/1
TABLET ORAL
Qty: 1 EACH | Refills: 0 | Status: SHIPPED | OUTPATIENT
Start: 2022-12-31

## 2022-12-31 NOTE — ED INITIAL ASSESSMENT (HPI)
Pt here with left eyelid itching, swelling and redness for 2 days. Denies pain, visual changes or drainage. Pt increased retinol 1 week ago.

## 2023-02-16 ENCOUNTER — LAB ENCOUNTER (OUTPATIENT)
Dept: LAB | Facility: HOSPITAL | Age: 33
End: 2023-02-16
Attending: NURSE PRACTITIONER
Payer: COMMERCIAL

## 2023-02-16 DIAGNOSIS — Z11.3 SCREENING EXAMINATION FOR VENEREAL DISEASE: Primary | ICD-10-CM

## 2023-02-16 DIAGNOSIS — D64.9 ANEMIA, UNSPECIFIED TYPE: ICD-10-CM

## 2023-02-16 LAB
ALBUMIN SERPL-MCNC: 3.8 G/DL (ref 3.4–5)
ALBUMIN/GLOB SERPL: 0.9 {RATIO} (ref 1–2)
ALP LIVER SERPL-CCNC: 120 U/L
ALT SERPL-CCNC: 32 U/L
ANION GAP SERPL CALC-SCNC: 6 MMOL/L (ref 0–18)
AST SERPL-CCNC: 20 U/L (ref 15–37)
BASOPHILS # BLD AUTO: 0.03 X10(3) UL (ref 0–0.2)
BASOPHILS NFR BLD AUTO: 0.4 %
BILIRUB SERPL-MCNC: 0.2 MG/DL (ref 0.1–2)
BILIRUB UR QL: NEGATIVE
BUN BLD-MCNC: 11 MG/DL (ref 7–18)
BUN/CREAT SERPL: 11.2 (ref 10–20)
CALCIUM BLD-MCNC: 9.5 MG/DL (ref 8.5–10.1)
CHLORIDE SERPL-SCNC: 108 MMOL/L (ref 98–112)
CLARITY UR: CLEAR
CO2 SERPL-SCNC: 26 MMOL/L (ref 21–32)
COLOR UR: YELLOW
CREAT BLD-MCNC: 0.98 MG/DL
DEPRECATED RDW RBC AUTO: 44.8 FL (ref 35.1–46.3)
EOSINOPHIL # BLD AUTO: 0.04 X10(3) UL (ref 0–0.7)
EOSINOPHIL NFR BLD AUTO: 0.6 %
ERYTHROCYTE [DISTWIDTH] IN BLOOD BY AUTOMATED COUNT: 15.8 % (ref 11–15)
FASTING STATUS PATIENT QL REPORTED: NO
GFR SERPLBLD BASED ON 1.73 SQ M-ARVRAT: 78 ML/MIN/1.73M2 (ref 60–?)
GLOBULIN PLAS-MCNC: 4.2 G/DL (ref 2.8–4.4)
GLUCOSE BLD-MCNC: 92 MG/DL (ref 70–99)
GLUCOSE UR-MCNC: NEGATIVE MG/DL
HCT VFR BLD AUTO: 41.6 %
HGB BLD-MCNC: 12.4 G/DL
HGB UR QL STRIP.AUTO: NEGATIVE
IMM GRANULOCYTES # BLD AUTO: 0.02 X10(3) UL (ref 0–1)
IMM GRANULOCYTES NFR BLD: 0.3 %
KETONES UR-MCNC: NEGATIVE MG/DL
LEUKOCYTE ESTERASE UR QL STRIP.AUTO: NEGATIVE
LYMPHOCYTES # BLD AUTO: 1.67 X10(3) UL (ref 1–4)
LYMPHOCYTES NFR BLD AUTO: 24.6 %
MCH RBC QN AUTO: 23.4 PG (ref 26–34)
MCHC RBC AUTO-ENTMCNC: 29.8 G/DL (ref 31–37)
MCV RBC AUTO: 78.3 FL
MONOCYTES # BLD AUTO: 0.33 X10(3) UL (ref 0.1–1)
MONOCYTES NFR BLD AUTO: 4.9 %
NEUTROPHILS # BLD AUTO: 4.71 X10 (3) UL (ref 1.5–7.7)
NEUTROPHILS # BLD AUTO: 4.71 X10(3) UL (ref 1.5–7.7)
NEUTROPHILS NFR BLD AUTO: 69.2 %
NITRITE UR QL STRIP.AUTO: NEGATIVE
OSMOLALITY SERPL CALC.SUM OF ELEC: 289 MOSM/KG (ref 275–295)
PH UR: 7 [PH] (ref 5–8)
PLATELET # BLD AUTO: 442 10(3)UL (ref 150–450)
POTASSIUM SERPL-SCNC: 3.6 MMOL/L (ref 3.5–5.1)
PROT SERPL-MCNC: 8 G/DL (ref 6.4–8.2)
PROT UR-MCNC: NEGATIVE MG/DL
RBC # BLD AUTO: 5.31 X10(6)UL
SODIUM SERPL-SCNC: 140 MMOL/L (ref 136–145)
SP GR UR STRIP: 1.01 (ref 1–1.03)
UROBILINOGEN UR STRIP-ACNC: <2
VIT C UR-MCNC: NEGATIVE MG/DL
WBC # BLD AUTO: 6.8 X10(3) UL (ref 4–11)

## 2023-02-16 PROCEDURE — 85025 COMPLETE CBC W/AUTO DIFF WBC: CPT

## 2023-02-16 PROCEDURE — 83540 ASSAY OF IRON: CPT

## 2023-02-16 PROCEDURE — 87661 TRICHOMONAS VAGINALIS AMPLIF: CPT

## 2023-02-16 PROCEDURE — 81003 URINALYSIS AUTO W/O SCOPE: CPT

## 2023-02-16 PROCEDURE — 82728 ASSAY OF FERRITIN: CPT

## 2023-02-16 PROCEDURE — 36415 COLL VENOUS BLD VENIPUNCTURE: CPT

## 2023-02-16 PROCEDURE — 87491 CHLMYD TRACH DNA AMP PROBE: CPT

## 2023-02-16 PROCEDURE — 87591 N.GONORRHOEAE DNA AMP PROB: CPT

## 2023-02-16 PROCEDURE — 80053 COMPREHEN METABOLIC PANEL: CPT

## 2023-02-16 PROCEDURE — 84466 ASSAY OF TRANSFERRIN: CPT

## 2023-02-17 LAB
C TRACH DNA SPEC QL NAA+PROBE: NEGATIVE
N GONORRHOEA DNA SPEC QL NAA+PROBE: NEGATIVE

## 2023-02-20 DIAGNOSIS — R74.8 ELEVATED ALKALINE PHOSPHATASE LEVEL: ICD-10-CM

## 2023-02-20 DIAGNOSIS — D64.9 ANEMIA, UNSPECIFIED TYPE: Primary | ICD-10-CM

## 2023-02-20 LAB
DEPRECATED HBV CORE AB SER IA-ACNC: 5.4 NG/ML
IRON SATN MFR SERPL: 6 %
IRON SERPL-MCNC: 25 UG/DL
TIBC SERPL-MCNC: 420 UG/DL (ref 240–450)
TRANSFERRIN SERPL-MCNC: 282 MG/DL (ref 200–360)

## 2023-02-20 RX ORDER — FERROUS SULFATE TAB EC 324 MG (65 MG FE EQUIVALENT) 324 (65 FE) MG
1 TABLET DELAYED RESPONSE ORAL
Qty: 18 TABLET | Refills: 0 | Status: SHIPPED | OUTPATIENT
Start: 2023-02-20 | End: 2023-04-06

## 2023-02-21 LAB — TRICHOMONAS VAGINALIS BY TMA: NEGATIVE

## 2023-02-22 ENCOUNTER — HOSPITAL ENCOUNTER (EMERGENCY)
Facility: HOSPITAL | Age: 33
Discharge: HOME | End: 2023-02-22
Payer: COMMERCIAL

## 2023-02-22 VITALS
TEMPERATURE: 98 F | HEART RATE: 88 BPM | SYSTOLIC BLOOD PRESSURE: 128 MMHG | HEIGHT: 68 IN | RESPIRATION RATE: 16 BRPM | BODY MASS INDEX: 41.22 KG/M2 | DIASTOLIC BLOOD PRESSURE: 76 MMHG | OXYGEN SATURATION: 100 % | WEIGHT: 272 LBS

## 2023-02-22 DIAGNOSIS — R51.9 NONINTRACTABLE HEADACHE, UNSPECIFIED CHRONICITY PATTERN, UNSPECIFIED HEADACHE TYPE: Primary | ICD-10-CM

## 2023-02-22 LAB
ANION GAP SERPL CALC-SCNC: 5 MMOL/L (ref 0–18)
BASOPHILS # BLD AUTO: 0.02 X10(3) UL (ref 0–0.2)
BASOPHILS NFR BLD AUTO: 0.2 %
BUN BLD-MCNC: 10 MG/DL (ref 7–18)
BUN/CREAT SERPL: 11.9 (ref 10–20)
CALCIUM BLD-MCNC: 9.2 MG/DL (ref 8.5–10.1)
CHLORIDE SERPL-SCNC: 110 MMOL/L (ref 98–112)
CO2 SERPL-SCNC: 25 MMOL/L (ref 21–32)
CREAT BLD-MCNC: 0.84 MG/DL
DEPRECATED RDW RBC AUTO: 44.6 FL (ref 35.1–46.3)
EOSINOPHIL # BLD AUTO: 0 X10(3) UL (ref 0–0.7)
EOSINOPHIL NFR BLD AUTO: 0 %
ERYTHROCYTE [DISTWIDTH] IN BLOOD BY AUTOMATED COUNT: 15.8 % (ref 11–15)
GFR SERPLBLD BASED ON 1.73 SQ M-ARVRAT: 93 ML/MIN/1.73M2 (ref 60–?)
GLUCOSE BLD-MCNC: 91 MG/DL (ref 70–99)
HCT VFR BLD AUTO: 38.1 %
HGB BLD-MCNC: 11.7 G/DL
IMM GRANULOCYTES # BLD AUTO: 0.05 X10(3) UL (ref 0–1)
IMM GRANULOCYTES NFR BLD: 0.4 %
LYMPHOCYTES # BLD AUTO: 0.94 X10(3) UL (ref 1–4)
LYMPHOCYTES NFR BLD AUTO: 8.4 %
MCH RBC QN AUTO: 24.1 PG (ref 26–34)
MCHC RBC AUTO-ENTMCNC: 30.7 G/DL (ref 31–37)
MCV RBC AUTO: 78.6 FL
MONOCYTES # BLD AUTO: 0.42 X10(3) UL (ref 0.1–1)
MONOCYTES NFR BLD AUTO: 3.8 %
NEUTROPHILS # BLD AUTO: 9.74 X10 (3) UL (ref 1.5–7.7)
NEUTROPHILS # BLD AUTO: 9.74 X10(3) UL (ref 1.5–7.7)
NEUTROPHILS NFR BLD AUTO: 87.2 %
OSMOLALITY SERPL CALC.SUM OF ELEC: 289 MOSM/KG (ref 275–295)
PLATELET # BLD AUTO: 430 10(3)UL (ref 150–450)
POTASSIUM SERPL-SCNC: 3.8 MMOL/L (ref 3.5–5.1)
RBC # BLD AUTO: 4.85 X10(6)UL
SODIUM SERPL-SCNC: 140 MMOL/L (ref 136–145)
WBC # BLD AUTO: 11.2 X10(3) UL (ref 4–11)

## 2023-02-22 PROCEDURE — 85025 COMPLETE CBC W/AUTO DIFF WBC: CPT | Performed by: NURSE PRACTITIONER

## 2023-02-22 PROCEDURE — 96361 HYDRATE IV INFUSION ADD-ON: CPT

## 2023-02-22 PROCEDURE — 80048 BASIC METABOLIC PNL TOTAL CA: CPT | Performed by: NURSE PRACTITIONER

## 2023-02-22 PROCEDURE — 99284 EMERGENCY DEPT VISIT MOD MDM: CPT

## 2023-02-22 PROCEDURE — 96374 THER/PROPH/DIAG INJ IV PUSH: CPT

## 2023-02-22 PROCEDURE — 96375 TX/PRO/DX INJ NEW DRUG ADDON: CPT

## 2023-02-22 RX ORDER — ONDANSETRON 2 MG/ML
4 INJECTION INTRAMUSCULAR; INTRAVENOUS ONCE
Status: COMPLETED | OUTPATIENT
Start: 2023-02-22 | End: 2023-02-22

## 2023-02-22 RX ORDER — ONDANSETRON 4 MG/1
4 TABLET, ORALLY DISINTEGRATING ORAL EVERY 4 HOURS PRN
Qty: 10 TABLET | Refills: 0 | Status: SHIPPED | OUTPATIENT
Start: 2023-02-22 | End: 2023-03-01

## 2023-02-22 RX ORDER — DIPHENHYDRAMINE HYDROCHLORIDE 50 MG/ML
12.5 INJECTION INTRAMUSCULAR; INTRAVENOUS ONCE
Status: COMPLETED | OUTPATIENT
Start: 2023-02-22 | End: 2023-02-22

## 2023-02-22 RX ORDER — METOCLOPRAMIDE HYDROCHLORIDE 5 MG/ML
10 INJECTION INTRAMUSCULAR; INTRAVENOUS ONCE
Status: COMPLETED | OUTPATIENT
Start: 2023-02-22 | End: 2023-02-22

## 2023-02-22 NOTE — ED INITIAL ASSESSMENT (HPI)
Pt came in w/ c/o vomiting for the past 24 hrs with headache that started roughly 2 hrs ago. Per pt denies all other complaints at the moment.

## 2023-03-01 ENCOUNTER — OFFICE VISIT (OUTPATIENT)
Dept: OBGYN CLINIC | Facility: CLINIC | Age: 33
End: 2023-03-01
Payer: COMMERCIAL

## 2023-03-01 VITALS
WEIGHT: 287.5 LBS | BODY MASS INDEX: 43.57 KG/M2 | HEIGHT: 68 IN | DIASTOLIC BLOOD PRESSURE: 84 MMHG | SYSTOLIC BLOOD PRESSURE: 116 MMHG

## 2023-03-01 DIAGNOSIS — Z12.4 ROUTINE CERVICAL SMEAR: Primary | ICD-10-CM

## 2023-03-01 DIAGNOSIS — Z11.3 SCREENING EXAMINATION FOR VENEREAL DISEASE: ICD-10-CM

## 2023-03-01 DIAGNOSIS — Z01.419 WOMEN'S ANNUAL ROUTINE GYNECOLOGICAL EXAMINATION: ICD-10-CM

## 2023-03-01 PROCEDURE — 87491 CHLMYD TRACH DNA AMP PROBE: CPT | Performed by: OBSTETRICS & GYNECOLOGY

## 2023-03-01 PROCEDURE — 99385 PREV VISIT NEW AGE 18-39: CPT | Performed by: OBSTETRICS & GYNECOLOGY

## 2023-03-01 PROCEDURE — 87624 HPV HI-RISK TYP POOLED RSLT: CPT | Performed by: OBSTETRICS & GYNECOLOGY

## 2023-03-01 PROCEDURE — 3079F DIAST BP 80-89 MM HG: CPT | Performed by: OBSTETRICS & GYNECOLOGY

## 2023-03-01 PROCEDURE — 87591 N.GONORRHOEAE DNA AMP PROB: CPT | Performed by: OBSTETRICS & GYNECOLOGY

## 2023-03-01 PROCEDURE — 99203 OFFICE O/P NEW LOW 30 MIN: CPT | Performed by: OBSTETRICS & GYNECOLOGY

## 2023-03-01 PROCEDURE — 3074F SYST BP LT 130 MM HG: CPT | Performed by: OBSTETRICS & GYNECOLOGY

## 2023-03-01 PROCEDURE — 3008F BODY MASS INDEX DOCD: CPT | Performed by: OBSTETRICS & GYNECOLOGY

## 2023-03-02 LAB
C TRACH DNA SPEC QL NAA+PROBE: NEGATIVE
HPV I/H RISK 1 DNA SPEC QL NAA+PROBE: NEGATIVE
N GONORRHOEA DNA SPEC QL NAA+PROBE: NEGATIVE

## 2023-05-18 ENCOUNTER — LAB REQUISITION (OUTPATIENT)
Dept: OCCUPATIONAL MEDICINE | Age: 33
End: 2023-05-18

## 2023-05-18 DIAGNOSIS — Z00.00 ENCOUNTER FOR GENERAL ADULT MEDICAL EXAMINATION WITHOUT ABNORMAL FINDINGS: ICD-10-CM

## 2023-05-18 PROCEDURE — PSEU9049 QUANTIFERON TB PLUS: Performed by: CLINICAL MEDICAL LABORATORY

## 2023-05-18 PROCEDURE — 86480 TB TEST CELL IMMUN MEASURE: CPT | Performed by: CLINICAL MEDICAL LABORATORY

## 2023-05-20 LAB
GAMMA INTERFERON BACKGROUND BLD IA-ACNC: 0.06 IU/ML
M TB IFN-G BLD-IMP: NEGATIVE
M TB IFN-G CD4+ BCKGRND COR BLD-ACNC: 0 IU/ML
M TB IFN-G CD4+CD8+ BCKGRND COR BLD-ACNC: 0 IU/ML
MITOGEN IGNF BCKGRD COR BLD-ACNC: >10 IU/ML

## 2024-05-28 ENCOUNTER — OFFICE VISIT (OUTPATIENT)
Dept: OBGYN CLINIC | Facility: CLINIC | Age: 34
End: 2024-05-28

## 2024-05-28 VITALS
DIASTOLIC BLOOD PRESSURE: 82 MMHG | SYSTOLIC BLOOD PRESSURE: 126 MMHG | BODY MASS INDEX: 44.41 KG/M2 | HEIGHT: 68 IN | WEIGHT: 293 LBS

## 2024-05-28 DIAGNOSIS — N89.8 VAGINAL IRRITATION: Primary | ICD-10-CM

## 2024-05-28 DIAGNOSIS — R10.2 PELVIC PAIN: ICD-10-CM

## 2024-05-28 PROCEDURE — 81514 NFCT DS BV&VAGINITIS DNA ALG: CPT | Performed by: OBSTETRICS & GYNECOLOGY

## 2024-05-28 PROCEDURE — 3079F DIAST BP 80-89 MM HG: CPT | Performed by: OBSTETRICS & GYNECOLOGY

## 2024-05-28 PROCEDURE — 99213 OFFICE O/P EST LOW 20 MIN: CPT | Performed by: OBSTETRICS & GYNECOLOGY

## 2024-05-28 PROCEDURE — 3074F SYST BP LT 130 MM HG: CPT | Performed by: OBSTETRICS & GYNECOLOGY

## 2024-05-28 PROCEDURE — 3008F BODY MASS INDEX DOCD: CPT | Performed by: OBSTETRICS & GYNECOLOGY

## 2024-05-28 PROCEDURE — 99459 PELVIC EXAMINATION: CPT | Performed by: OBSTETRICS & GYNECOLOGY

## 2024-05-28 RX ORDER — TIRZEPATIDE 2.5 MG/.5ML
2.5 INJECTION, SOLUTION SUBCUTANEOUS WEEKLY
COMMUNITY
Start: 2024-05-06

## 2024-05-28 RX ORDER — PROPRANOLOL HYDROCHLORIDE 60 MG/1
60 TABLET ORAL EVERY 6 HOURS PRN
COMMUNITY
Start: 2023-12-08

## 2024-05-28 RX ORDER — METHIMAZOLE 5 MG/1
TABLET ORAL
COMMUNITY

## 2024-05-28 NOTE — PROGRESS NOTES
Delaney Jackson is a 34 year old female.    HPI:     Chief Complaint   Patient presents with    Vaginal Problem     Pt states vaginal pain during menstrual cycle since October, states she was diagnosed with hyperthyroidism and since then has been getting vaginal dryness around her menstrual cycles as well        Here with concerns about vaginal pain and dryness around menses. Experiencing external irritation as well.  No heavy discharge or abnormal bleeding. Discussed screening cervical cultures today and scheduling pelvic USN during week before next period to assess pelvic anatomy and vasculature.     Medications (Active prior to today's visit):  Current Outpatient Medications   Medication Sig Dispense Refill    ZEPBOUND 2.5 MG/0.5ML Subcutaneous Solution Auto-injector Inject 2.5 mg into the skin once a week.      Propranolol HCl 60 MG Oral Tab Take 1 tablet (60 mg total) by mouth every 6 (six) hours as needed.      methIMAzole 5 MG Oral Tab       VYVANSE 40 MG Oral Cap       spironolactone 100 MG Oral Tab Take 1 tablet (100 mg total) by mouth daily.      Ibuprofen 200 MG Oral Cap Take 1 capsule (200 mg total) by mouth as needed.      OZEMPIC, 2 MG/DOSE, 8 MG/3ML Subcutaneous Solution Pen-injector INJECT 2MG UNDER THE SKIN ONCE A WEEK         Allergies:  Allergies   Allergen Reactions    Morphine HIVES    Other HIVES       /82   Ht 68\"   Wt (!) 306 lb (138.8 kg)   LMP 05/11/2024 (Exact Date)   BMI 46.53 kg/m²     PHYSICAL EXAM:   GENERAL: Well developed, well nourished, in no apparent distress  GYNE/:   External Genitalia: mild erythema across bilateral majora, no lesions           Vagina: normal mucosa, no lesions, mucoid discharge noted and cultured                Cervix: normal os, no lesions or bleeding                    R/V: normal perineum, no hemorrhoids  EXTREMITIES: nontender without edema      ASSESSMENT/PLAN:   Assessment       1. Pelvic pain  - Pelvic USN during week before menses  - Return  to review    2. Vaginal irritation  - Cervical cultures done  - Topical zinc oxide externally for relief while await results      Total time spent = 20 minutes  >50% of visit = face to face discussion and coordination of care        5/28/2024  Maryuri Mulligan MD

## 2024-05-29 LAB
BV BACTERIA DNA VAG QL NAA+PROBE: NEGATIVE
C GLABRATA DNA VAG QL NAA+PROBE: NEGATIVE
C KRUSEI DNA VAG QL NAA+PROBE: NEGATIVE
CANDIDA DNA VAG QL NAA+PROBE: NEGATIVE
T VAGINALIS DNA VAG QL NAA+PROBE: NEGATIVE

## 2024-07-10 ENCOUNTER — TELEPHONE (OUTPATIENT)
Dept: OBGYN CLINIC | Facility: CLINIC | Age: 34
End: 2024-07-10

## (undated) NOTE — LETTER
Date & Time: 12/14/2018, 5:11 PM  Patient: Geovani Vergara  Encounter Provider(s):    Imelda Gold MD       To Whom It May Concern:    Luis Gleason was seen and treated in our department on 12/14/2018.      If you have any questions or concerns,

## (undated) NOTE — MR AVS SNAPSHOT
[address] [address] [phone]               Thank you for choosing us for your health care visit with Sundar Jaquez NP.   We are glad to serve you and happy to provide you with this summary of your · Ease pain with anesthetic sprays. Aspirin or an aspirin substitute also helps.  Remember, never give aspirin to anyone 25 or younger, or if you are already taking blood thinners.   · For sore throats caused by allergies, try antihistamines to block the al Where to Get Your Medications      These medications were sent to [id number]- [address], [address]  AT [place], [phone], [facility] [address] [facility] [address] [id number]     Phone:  [phone] - l

## (undated) NOTE — MR AVS SNAPSHOT
[facility]  [address] [facility] [address] [phone]               Thank you for choosing us for your health care visit with Serafin Tejeda MD.  We are glad to serve you and happy to provide you with this summary of your vi Anemia           Medical Issues Discussed Today     Iron deficiency anemia, unspecified iron deficiency anemia type    -  Primary    Other fatigue        BMI 35.0-35.9,adult        Morbid obesity, unspecified obesity type          Instructions and [employer]

## (undated) NOTE — LETTER
Date & Time: 6/25/2018, 12:39 AM  Patient: Kandis Pina  Encounter Provider(s):    Tianna Burnette MD       To Whom It May Concern:    Kana Pate was seen and treated in our department on 6/24/2018. She may excused from work until 6.30.18.     If

## (undated) NOTE — ED AVS SNAPSHOT
[place]   MRN: [id number]    Department:  [facility]   Date of Visit:  6/24/2018           Disclosure     Insurance plans vary and the physician(s) referred by the ER may not be covered by your plan.  Please contact CARE PHYSICIAN AT ONCE OR RETURN IMMEDIATELY TO THE EMERGENCY DEPARTMENT. If you have been prescribed any medication(s), please fill your prescription right away and begin taking the medication(s) as directed.   If you believe that any of the medications

## (undated) NOTE — LETTER
AUTHORIZATION FOR SURGICAL OPERATION OR OTHER PROCEDURE    1.  I [facility] Koby Martins, [facility] to my case to perform the following operation and/or procedure at the [facility]:    __________________________ Patient signature:  ___________________________________________________             Relationship to Patient:           []  Parent    Responsible person                          []  Spouse  In case of minor or                    [] Other  _____________   In